# Patient Record
Sex: FEMALE | Race: BLACK OR AFRICAN AMERICAN | Employment: FULL TIME | URBAN - METROPOLITAN AREA
[De-identification: names, ages, dates, MRNs, and addresses within clinical notes are randomized per-mention and may not be internally consistent; named-entity substitution may affect disease eponyms.]

---

## 2017-01-06 ENCOUNTER — OFFICE VISIT (OUTPATIENT)
Dept: ENDOCRINOLOGY | Age: 52
End: 2017-01-06

## 2017-01-06 VITALS
HEIGHT: 67 IN | OXYGEN SATURATION: 100 % | HEART RATE: 90 BPM | BODY MASS INDEX: 34.21 KG/M2 | TEMPERATURE: 97 F | RESPIRATION RATE: 18 BRPM | SYSTOLIC BLOOD PRESSURE: 150 MMHG | DIASTOLIC BLOOD PRESSURE: 84 MMHG | WEIGHT: 218 LBS

## 2017-01-06 DIAGNOSIS — E11.9 TYPE 2 DIABETES MELLITUS WITHOUT COMPLICATION, UNSPECIFIED LONG TERM INSULIN USE STATUS: Primary | ICD-10-CM

## 2017-01-06 DIAGNOSIS — I10 ESSENTIAL HYPERTENSION: ICD-10-CM

## 2017-01-06 DIAGNOSIS — E78.2 MIXED HYPERLIPIDEMIA: ICD-10-CM

## 2017-01-06 LAB — HBA1C MFR BLD HPLC: 7.9 %

## 2017-01-06 RX ORDER — INSULIN ASPART 100 [IU]/ML
INJECTION, SOLUTION INTRAVENOUS; SUBCUTANEOUS
Qty: 20 ML | Refills: 0 | Status: SHIPPED | COMMUNITY
Start: 2017-01-06 | End: 2020-12-22

## 2017-01-06 RX ORDER — INSULIN ASPART 100 [IU]/ML
14 INJECTION, SOLUTION INTRAVENOUS; SUBCUTANEOUS
COMMUNITY

## 2017-01-06 RX ORDER — INDOMETHACIN 50 MG/1
1 CAPSULE ORAL DAILY
COMMUNITY
Start: 2017-01-02

## 2017-01-06 RX ORDER — LOSARTAN POTASSIUM AND HYDROCHLOROTHIAZIDE 12.5; 1 MG/1; MG/1
1 TABLET ORAL DAILY
Qty: 30 TAB | Refills: 6 | Status: SHIPPED | OUTPATIENT
Start: 2017-01-06

## 2017-01-06 NOTE — PATIENT INSTRUCTIONS
Check blood sugars immediately before each meal and at bedtime     janumet xr 50/1000 mg twice a day with meals      [id number]/12.5 mg a day       Take  toujeo  insulin  50 units  at bed time    Take novolog  insulin 14 units before breakfast (11 AM ), 14 units before lunch (4-5 PM) and 14 units before dinner (9-10 PM).     Also, add additional novolog  as follows with meals  If blood sugars are[de-identified]    150-200 mg 2 units    201-250 mg 5 units    251-300 mg 8 units    301-350 mg 11 units    351-400 mg 14 units    401-450 mg 17 units    451-500 mg 20 units     Less than 70 mg NO INSULIN      Use apidra, novolog and humalog interchangeably       Do not skip meals  Do not eat in between meals    Reduce carbs- pasta, rice, potatoes, bread   Do not drink juices or sodas  Donot eat peanut butter     Do not eat sugar free cookies and cakes   Do not eat peaches, grapes, oranges,  pineapples

## 2017-01-06 NOTE — MR AVS SNAPSHOT
Visit Information Date & Time Provider Department Dept. Phone Encounter #  
 1/6/2017  3:30 PM Oral Bai [facility] [phone] [id number] Follow-up Instructions Return in about 6 months (around 7/6/2017). Upcoming Health Maintenance Date Due Hepatitis C Screening 1965 FOOT EXAM Q1 5/26/1975 EYE EXAM RETINAL OR DILATED Q1 5/26/1975 Pneumococcal 19-64 Medium Risk (1 of 1 - PPSV23) 5/26/1984 DTaP/Tdap/Td series (1 - Tdap) 5/26/1986 PAP AKA CERVICAL CYTOLOGY 5/26/1986 BREAST CANCER SCRN MAMMOGRAM 5/26/2015 FOBT Q 1 YEAR AGE 50-75 5/26/2015 MICROALBUMIN Q1 6/10/2015 INFLUENZA AGE 9 TO ADULT 8/1/2016 HEMOGLOBIN A1C Q6M 12/9/2016 LIPID PANEL Q1 6/9/2017 Allergies as of 1/6/2017  Review Complete On: 1/6/2017 By: Oral Bai MD  
  
 Severity Noted Reaction Type Reactions Lisinopril  01/06/2017    Cough Current Immunizations  Never Reviewed No immunizations on file. Not reviewed this visit You Were Diagnosed With   
  
 Codes Comments Type 2 diabetes mellitus without complication, unspecified long term insulin use status (HCC)    -  Primary ICD-10-CM: E11.9 ICD-9-CM: 250.00 Essential hypertension     ICD-10-CM: I10 
ICD-9-CM: 401.9 Mixed hyperlipidemia     ICD-10-CM: E78.2 ICD-9-CM: 272.2 Vitals BP Pulse Temp Resp Height(growth percentile) Weight(growth percentile) 150/84 90 97 °F (36.1 °C) (Oral) 18 5' 7\" (1.702 m) 218 lb (98.9 kg) SpO2 BMI OB Status Smoking Status 100% 34.14 kg/m2 Hysterectomy Never Smoker BMI and BSA Data Body Mass Index Body Surface Area  
 34.14 kg/m 2 2.16 m 2 Preferred Pharmacy Pharmacy Name Phone [phone], [address] [address] Your Updated Medication List  
  
   
This list is accurate as of: 1/6/17  4:37 PM.  Always use your most recent med list.  
  
  
  
  
 aspirin delayed-release 81 mg tablet Take  by mouth daily. diphenhydrAMINE 25 mg tablet Commonly known as:  BENADRYL Take 25 mg by mouth every six (6) hours as needed for Sleep.  
  
 folic acid 792 mcg tablet Take 800 mcg by mouth daily. glucose blood VI test strips strip Commonly known as:  ASCENSIA AUTODISC VI, ONE TOUCH ULTRA TEST VI  
by Does Not Apply route See Admin Instructions. indomethacin 50 mg capsule Commonly known as:  INDOCIN Take 1 Cap by mouth daily. * insulin glargine 300 unit/mL (1.5 mL) Inpn Commonly known as:  TOUJEO SOLOSTAR  
50 Units by SubCUTAneous route nightly. Verbal order received to give Toujeo as a sample. * insulin glargine 300 unit/mL (1.5 mL) Inpn Commonly known as:  TOUJEO SOLOSTAR  
50 Units by SubCUTAneous route nightly. sample * insulin glargine 300 unit/mL (1.5 mL) Inpn Commonly known as:  TOUJEO SOLOSTAR  
50 Units by SubCUTAneous route nightly. insulin glulisine 100 unit/mL pen Commonly known as:  APIDRA SOLOSTAR  
sample * insulin lispro 100 unit/mL kwikpen Commonly known as:  HumaLOG KwikPen Inject 14 units before Breakfast, Lunch and Dinner, Plus sliding scale. Max daily dose 102 units * insulin lispro 100 unit/mL injection Commonly known as:  HUMALOG  
sample Insulin Needles (Disposable) 31 gauge x 3/16\" Ndle Commonly known as:  BD INSULIN PEN NEEDLE UF MINI Inject 4 times daily. Dx code 250.00 Insulin Syringe-Needle U-100 1 mL 30 gauge x 5/16 Syrg Commonly known as:  INSULIN SYRINGE  
100 Each by Does Not Apply route daily. losartan-hydroCHLOROthiazide 100-12.5 mg per tablet Commonly known as:  HYZAAR Take 1 Tab by mouth daily. STOP 50/12.5 MG DOSE  
  
 multivitamin tablet Commonly known as:  ONE A DAY Take 1 Tab by mouth daily. NovoLOG 100 unit/mL injection Generic drug:  insulin aspart 14 Units by SubCUTAneous route Before breakfast, lunch, and dinner. Plus sliding scale  
  
 pravastatin 40 mg tablet Commonly known as:  PRAVACHOL Take 40 mg by mouth nightly. PriLOSEC OTC 20 mg tablet Generic drug:  omeprazole Take 20 mg by mouth daily. SITagliptin-metFORMIN 50-1,000 mg Tm24 Commonly known as:  JANUMET XR Take 1 Tab by mouth two (2) times daily (with meals). This is a change in dose VITAMIN B-12 1,000 mcg tablet Generic drug:  cyanocobalamin Take 1,000 mcg by mouth daily. VITAMIN D3 1,000 unit Cap Generic drug:  cholecalciferol Take  by mouth. * Notice: This list has 5 medication(s) that are the same as other medications prescribed for you. Read the directions carefully, and ask your doctor or other care provider to review them with you. We Performed the Following AMB POC HEMOGLOBIN A1C [34203 CPT(R)] LIPID PANEL [80929 CPT(R)] METABOLIC PANEL, COMPREHENSIVE [61689 CPT(R)] MICROALBUMIN, UR, RAND W/ MICROALBUMIN/CREA RATIO [id number] CPT(R)] Follow-up Instructions Return in about 6 months (around 7/6/2017). Patient Instructions Check blood sugars immediately before each meal and at bedtime 
 
 janumet xr 50/1000 mg twice a day with meals  
 
 [id number]/12.5 mg a day Take  toujeo  insulin  50 units  at bed time Take novolog  insulin 14 units before breakfast (11 AM ), 14 units before lunch (4-5 PM) and 14 units before dinner (9-10 PM). Also, add additional novolog  as follows with meals  If blood sugars are[de-identified] 
 
150-200 mg 2 units 201-250 mg 5 units 251-300 mg 8 units 301-350 mg 11 units 351-400 mg 14 units 401-450 mg 17 units 451-500 mg 20 units Less than 70 mg NO INSULIN 
 
 
Use apidra, novolog and humalog interchangeably Do not skip meals Do not eat in between meals Reduce carbs- pasta, rice, potatoes, bread Do not drink juices or sodas Donot eat peanut butter Do not eat sugar free cookies and cakes Do not eat peaches, grapes, oranges,  pineapples Introducing [facility]! [facility] introduces [facility] patient portal. Now you can access parts of your medical record, email your doctor's office, and request medication refills online. 1. In your internet browser, go to [email or web address] 2. Click on the First Time User? Click Here link in the Sign In box. You will see the New Member Sign Up page. 3. Enter your [facility] Access Code exactly as it appears below. You will not need to use this code after youve completed the sign-up process. If you do not sign up before the expiration date, you must request a new code. · [facility] Access Code: [email or web address] Expires: 4/6/2017  4:37 PM 
 
4. Enter the last four digits of your Social Security Number (xxxx) and Date of Birth (mm/dd/yyyy) as indicated and click Submit. You will be taken to the next sign-up page. 5. Create a [facility] ID. This will be your [facility] login ID and cannot be changed, so think of one that is secure and easy to remember. 6. Create a [facility] password. You can change your password at any time. 7. Enter your Password Reset Question and Answer. This can be used at a later time if you forget your password. 8. Enter your e-mail address. You will receive e-mail notification when new information is available in [address] 9. Click Sign Up. You can now view and download portions of your medical record. 10. Click the Download Summary menu link to download a portable copy of your medical information. If you have questions, please visit the Frequently Asked Questions section of the [facility] [facility] website. Remember, [facility] [facility] is NOT to be used for urgent needs. For medical emergencies, dial 911. Now available from your iPhone and Android! Please provide this summary of care documentation to your next provider. Your primary care clinician is listed as Nery Mclean. If you have any questions after today's visit, please call [phone].

## 2017-01-06 NOTE — PROGRESS NOTES
HISTORY OF PRESENT ILLNESS  Lubna Yuan is a 46 y.o. female. HPI  Patient here for  F/u after  initial visit of Type 2 diabetes mellitus  From June 2016   2 years of GAP prior to last visit     She lost 4 lbs        Got hospitalized and her hyzaar was changed to diltiazem  She felt diltiazem was giving her cough ( double checked ) and she likes hyzaar and is requesting refill   She had descent blood sugars - when compliant     She is happy with better control but has no plan to have this affordable      Old history :   Current A1C is over 11 % and symptoms/problems include hyperglycemias     Current diabetic medications include intensive insulin injection program.     Current monitoring regimen: home blood tests - one times daily  Home blood sugar records: trend: increasing rapidly  Any episodes of hypoglycemia? no    Weight trend: increasing steadily  Prior visit with dietician: no  Current diet: \"unhealthy\" diet in general  Current exercise: no regular exercise    Known diabetic complications: none  Cardiovascular risk factors: dyslipidemia, diabetes mellitus, hypertension        Review of Systems   Constitutional: Negative. HENT: Negative. Eyes: Positive for blurred vision. Negative for pain and redness. Respiratory: Negative. Cardiovascular: Negative for chest pain, palpitations and leg swelling. Gastrointestinal: Negative. Negative for constipation. Genitourinary: Negative. Musculoskeletal: Negative for myalgias. Skin: Negative. Neurological: Negative. Endo/Heme/Allergies: Negative. Does not bruise/bleed easily. Psychiatric/Behavioral: Negative for depression and memory loss. The patient does not have insomnia. Physical Exam   Constitutional: She is oriented to person, place, and time. She appears well-developed and well-nourished. HENT:   Head: Normocephalic. Eyes: Conjunctivae and EOM are normal. Pupils are equal, round, and reactive to light.    Neck: Normal range of motion. Neck supple. No JVD present. No tracheal deviation present. thyromegaly present. Cardiovascular: Normal rate, regular rhythm and normal heart sounds. No murmur heard. Pulmonary/Chest: Breath sounds normal.   Abdominal: Soft. Bowel sounds are normal.   Musculoskeletal: Normal range of motion. Lymphadenopathy:     She has no cervical adenopathy. Neurological: She is alert and oriented to person, place, and time. She has normal reflexes. Skin: Skin is warm. Psychiatric: She has a normal mood and affect. Lab Results  Component Value Date/Time   Hemoglobin A1c 8.4 06/09/2016 11:03 AM   Hemoglobin A1c, External 8.2 04/14/2016   Glucose 152 06/09/2016 11:03 AM   Glucose  06/10/2014 03:11 PM   Microalb/Creat ratio (ug/mg creat.) 102.5 06/10/2014 03:50 PM   LDL, calculated 140 06/09/2016 11:03 AM   Creatinine 1.00 06/09/2016 11:03 AM      Lab Results  Component Value Date/Time   Cholesterol, total 226 06/09/2016 11:03 AM   HDL Cholesterol 61 06/09/2016 11:03 AM   LDL, calculated 140 06/09/2016 11:03 AM   Triglyceride 123 06/09/2016 11:03 AM       Lab Results  Component Value Date/Time   ALT 14 06/09/2016 11:03 AM   AST 12 06/09/2016 11:03 AM   Alk. phosphatase 68 06/09/2016 11:03 AM   Bilirubin, total 0.3 06/09/2016 11:03 AM       Lab Results  Component Value Date/Time   GFR est AA 75 06/09/2016 11:03 AM   GFR est non-AA 65 06/09/2016 11:03 AM   Creatinine 1.00 06/09/2016 11:03 AM   BUN 19 06/09/2016 11:03 AM   Sodium 142 06/09/2016 11:03 AM   Potassium 4.2 06/09/2016 11:03 AM   Chloride 99 06/09/2016 11:03 AM   CO2 26 06/09/2016 11:03 AM          ASSESSMENT and PLAN  1.  Type 2 DM, uncontrolled : A1c is   7.6 %    From   Jan 2017   Compared to     8.1 %  From April 2016   Compared to   7.5 %  From June 2014 compared to  8.8 % from aug 8 th 2013 compared to over 10 % from oct 2012; over 11 % from aug and sept 2012;     Slight improvement   She is afraid to take insulin, if sugars are below 110 mg  Counseled her that Toujeo and lantus are same kinds of insulin, but concentrated forms    Continuing basal bolus regimen  Changing to novolog by formulary  as of no copays  Changing to Janumet xr from kombiglyze 2.5 / 1gm bid    Discussed SGL-2 and inj incretins with her , but will defer it now    Patient is advised about checking blood sugars 4 times a day and maintaining log book. 2. Hypoglycemia :  Educated on treating the hypoglycemia. Discussed insulin use and prescribed    3. HTN : controlled on hyzaar 50/12.5   Stopped diltiazem xr for cough she reconfirms   She had cough with lisinopril in the past     4. Dyslipidemia : continue pravachol. Lipids are high. Patient is educated about benefits and adverse effects of statins and explained how benefits outweigh risk.     5. use of aspirin to prevent MI and TIA's discussed        > 50 % of time is spent on counseling

## 2017-01-06 NOTE — PROGRESS NOTES
Wt Readings from Last 3 Encounters:   01/06/17 218 lb (98.9 kg)   06/09/16 222 lb (100.7 kg)   06/15/14 232 lb (105.2 kg)     Temp Readings from Last 3 Encounters:   01/06/17 97 °F (36.1 °C) (Oral)   06/09/16 98.4 °F (36.9 °C) (Oral)     BP Readings from Last 3 Encounters:   01/06/17 150/84   06/09/16 157/87   06/15/14 135/87     Pulse Readings from Last 3 Encounters:   01/06/17 90   06/09/16 86   06/10/14 92     Lab Results   Component Value Date/Time    Hemoglobin A1c 8.4 06/09/2016 11:03 AM    Hemoglobin A1c (POC) 7.5 06/10/2014 03:11 PM    Hemoglobin A1c, External 8.2 04/14/2016     No Podiatry  Last Eye exam 2016

## 2017-01-09 LAB
ALBUMIN SERPL-MCNC: 4.9 G/DL (ref 3.5–5.5)
ALBUMIN/CREAT UR: 63.9 MG/G CREAT (ref 0–30)
ALBUMIN/GLOB SERPL: 1.6 {RATIO} (ref 1.1–2.5)
ALP SERPL-CCNC: 73 IU/L (ref 39–117)
ALT SERPL-CCNC: 15 IU/L (ref 0–32)
AST SERPL-CCNC: 13 IU/L (ref 0–40)
BILIRUB SERPL-MCNC: <0.2 MG/DL (ref 0–1.2)
BUN SERPL-MCNC: 20 MG/DL (ref 6–24)
BUN/CREAT SERPL: 17 (ref 9–23)
CALCIUM SERPL-MCNC: 10 MG/DL (ref 8.7–10.2)
CHLORIDE SERPL-SCNC: 99 MMOL/L (ref 96–106)
CHOLEST SERPL-MCNC: 184 MG/DL (ref 100–199)
CO2 SERPL-SCNC: 24 MMOL/L (ref 18–29)
CREAT SERPL-MCNC: 1.2 MG/DL (ref 0.57–1)
CREAT UR-MCNC: 208.5 MG/DL
GLOBULIN SER CALC-MCNC: 3 G/DL (ref 1.5–4.5)
GLUCOSE SERPL-MCNC: 171 MG/DL (ref 65–99)
HDLC SERPL-MCNC: 56 MG/DL
INTERPRETATION, 910389: NORMAL
INTERPRETATION: NORMAL
LDLC SERPL CALC-MCNC: 108 MG/DL (ref 0–99)
MICROALBUMIN UR-MCNC: 133.2 UG/ML
POTASSIUM SERPL-SCNC: 5.2 MMOL/L (ref 3.5–5.2)
PROT SERPL-MCNC: 7.9 G/DL (ref 6–8.5)
SODIUM SERPL-SCNC: 142 MMOL/L (ref 134–144)
SPECIMEN STATUS REPORT, ROLRST: NORMAL
TRIGL SERPL-MCNC: 100 MG/DL (ref 0–149)
VLDLC SERPL CALC-MCNC: 20 MG/DL (ref 5–40)

## 2017-03-17 ENCOUNTER — TELEPHONE (OUTPATIENT)
Dept: ENDOCRINOLOGY | Age: 52
End: 2017-03-17

## 2017-03-20 NOTE — TELEPHONE ENCOUNTER
Called patient concerning question about Novolog. Patient requesting a sample. Explained to patient samples are only given at office visits. Her next appointment is scheduled in June. Explained to patient her options were to apply for patient assistance or go to [employer] and buy insulin overcounter. Explained to patient which insulin she could use in place of Novolog. Patient stated she is not sure what she will decide to do.kathytent stated she is taking her other medications without issues.

## 2017-07-13 RX ORDER — SITAGLIPTIN AND METFORMIN HYDROCHLORIDE 50; 1000 MG/1; MG/1
TABLET, FILM COATED, EXTENDED RELEASE ORAL
Qty: 60 TAB | Refills: 0 | Status: SHIPPED | OUTPATIENT
Start: 2017-07-13

## 2017-10-20 ENCOUNTER — OP HISTORICAL/CONVERTED ENCOUNTER (OUTPATIENT)
Dept: OTHER | Age: 52
End: 2017-10-20

## 2018-02-26 RX ORDER — BLOOD-GLUCOSE METER
KIT MISCELLANEOUS
Qty: 100 STRIP | Refills: 6 | Status: SHIPPED | OUTPATIENT
Start: 2018-02-26 | End: 2019-03-11 | Stop reason: SDUPTHER

## 2018-09-17 ENCOUNTER — OP HISTORICAL/CONVERTED ENCOUNTER (OUTPATIENT)
Dept: OTHER | Age: 53
End: 2018-09-17

## 2019-03-11 RX ORDER — BLOOD-GLUCOSE METER
KIT MISCELLANEOUS
Qty: 100 STRIP | Refills: 6 | Status: SHIPPED | OUTPATIENT
Start: 2019-03-11 | End: 2019-10-19 | Stop reason: SDUPTHER

## 2019-10-20 RX ORDER — BLOOD-GLUCOSE METER
KIT MISCELLANEOUS
Qty: 100 STRIP | Refills: 6 | Status: SHIPPED | OUTPATIENT
Start: 2019-10-20

## 2020-07-17 ENCOUNTER — ED HISTORICAL/CONVERTED ENCOUNTER (OUTPATIENT)
Dept: OTHER | Age: 55
End: 2020-07-17

## 2020-08-29 ENCOUNTER — ED HISTORICAL/CONVERTED ENCOUNTER (OUTPATIENT)
Dept: OTHER | Age: 55
End: 2020-08-29

## 2020-09-28 ENCOUNTER — HOSPITAL ENCOUNTER (OUTPATIENT)
Dept: MAMMOGRAPHY | Age: 55
Discharge: HOME | End: 2020-09-28
Payer: COMMERCIAL

## 2020-09-28 DIAGNOSIS — Z12.31 SCREENING MAMMOGRAM FOR HIGH-RISK PATIENT: ICD-10-CM

## 2020-09-28 PROCEDURE — 77067 SCR MAMMO BI INCL CAD: CPT

## 2020-12-22 ENCOUNTER — HOSPITAL ENCOUNTER (EMERGENCY)
Age: 55
Discharge: HOME | End: 2020-12-22
Attending: EMERGENCY MEDICINE
Payer: COMMERCIAL

## 2020-12-22 ENCOUNTER — APPOINTMENT (OUTPATIENT)
Dept: CT IMAGING | Age: 55
End: 2020-12-22
Attending: EMERGENCY MEDICINE
Payer: COMMERCIAL

## 2020-12-22 VITALS
DIASTOLIC BLOOD PRESSURE: 86 MMHG | WEIGHT: 220 LBS | RESPIRATION RATE: 18 BRPM | HEART RATE: 117 BPM | HEIGHT: 68 IN | TEMPERATURE: 98.9 F | SYSTOLIC BLOOD PRESSURE: 172 MMHG | OXYGEN SATURATION: 100 % | BODY MASS INDEX: 33.34 KG/M2

## 2020-12-22 DIAGNOSIS — I10 ESSENTIAL HYPERTENSION: Primary | ICD-10-CM

## 2020-12-22 DIAGNOSIS — J01.00 ACUTE NON-RECURRENT MAXILLARY SINUSITIS: ICD-10-CM

## 2020-12-22 DIAGNOSIS — N39.0 URINARY TRACT INFECTION WITHOUT HEMATURIA, SITE UNSPECIFIED: ICD-10-CM

## 2020-12-22 DIAGNOSIS — R73.9 HYPERGLYCEMIA: ICD-10-CM

## 2020-12-22 LAB
AMORPH CRY URNS QL MICRO: ABNORMAL
APPEARANCE UR: CLEAR
BACTERIA URNS QL MICRO: NEGATIVE /HPF
BILIRUB UR QL: NEGATIVE
COLOR UR: YELLOW
EPITH CASTS URNS QL MICRO: ABNORMAL /LPF
GLUCOSE BLD STRIP.AUTO-MCNC: 166 MG/DL (ref 65–100)
GLUCOSE UR STRIP.AUTO-MCNC: 50 MG/DL
HGB UR QL STRIP: NEGATIVE
KETONES UR QL STRIP.AUTO: NEGATIVE MG/DL
LEUKOCYTE ESTERASE UR QL STRIP.AUTO: ABNORMAL
NITRITE UR QL STRIP.AUTO: NEGATIVE
PERFORMED BY, TECHID: ABNORMAL
PH UR STRIP: 5 [PH] (ref 5–8)
PROT UR STRIP-MCNC: 30 MG/DL
RBC #/AREA URNS HPF: ABNORMAL /HPF (ref 0–5)
SP GR UR REFRACTOMETRY: 1.01 (ref 1–1.03)
UROBILINOGEN UR QL STRIP.AUTO: 0.1 EU/DL (ref 0.2–1)
WBC URNS QL MICRO: ABNORMAL /HPF (ref 0–4)

## 2020-12-22 PROCEDURE — 96374 THER/PROPH/DIAG INJ IV PUSH: CPT

## 2020-12-22 PROCEDURE — 82962 GLUCOSE BLOOD TEST: CPT

## 2020-12-22 PROCEDURE — 81001 URINALYSIS AUTO W/SCOPE: CPT

## 2020-12-22 PROCEDURE — 74011250636 HC RX REV CODE- 250/636: Performed by: EMERGENCY MEDICINE

## 2020-12-22 PROCEDURE — 99283 EMERGENCY DEPT VISIT LOW MDM: CPT

## 2020-12-22 PROCEDURE — 70450 CT HEAD/BRAIN W/O DYE: CPT

## 2020-12-22 RX ORDER — ONDANSETRON 2 MG/ML
4 INJECTION INTRAMUSCULAR; INTRAVENOUS
Status: COMPLETED | OUTPATIENT
Start: 2020-12-22 | End: 2020-12-22

## 2020-12-22 RX ORDER — LORATADINE 10 MG/1
10 TABLET ORAL DAILY
Qty: 10 TAB | Refills: 0 | Status: SHIPPED | OUTPATIENT
Start: 2020-12-22 | End: 2021-01-01

## 2020-12-22 RX ORDER — CEPHALEXIN 500 MG/1
500 CAPSULE ORAL 4 TIMES DAILY
Qty: 28 CAP | Refills: 0 | Status: SHIPPED | OUTPATIENT
Start: 2020-12-22 | End: 2020-12-29

## 2020-12-22 RX ADMIN — SODIUM CHLORIDE 1000 ML: 9 INJECTION, SOLUTION INTRAVENOUS at 16:17

## 2020-12-22 RX ADMIN — ONDANSETRON 4 MG: 2 INJECTION INTRAMUSCULAR; INTRAVENOUS at 17:15

## 2020-12-22 NOTE — ED PROVIDER NOTES
EMERGENCY DEPARTMENT HISTORY AND PHYSICAL EXAM      Date: 12/22/2020  Patient Name: Maren Pink      History of Presenting Illness     Chief Complaint   Patient presents with    Headache    Hypertension    High Blood Sugar       History Provided By: Patient    HPI: Maren Pink, 54 y.o. female with a past medical history significant diabetes and hypertension presents to the ED with cc of Headache, elevated BS. Went to [facility] and had labs done. BS was 207, Cr 1.4, Hb 11. Labs otherwise normal. Was sent here for further evaluation. 1 day    There are no other complaints, changes, or physical findings at this time. PCP: Henri Stock MD    Current Facility-Administered Medications   Medication Dose Route Frequency Provider Last Rate Last Admin    sodium chloride 0.9 % bolus infusion 1,000 mL  1,000 mL IntraVENous ONCE Kurtis Collier MD 1,000 mL/hr at 12/22/20 1617 1,000 mL at 12/22/20 1617     Current Outpatient Medications   Medication Sig Dispense Refill    cephALEXin (Keflex) 500 mg capsule Take 1 Cap by mouth four (4) times daily for 7 days. 28 Cap 0    loratadine (Claritin) 10 mg tablet Take 1 Tab by mouth daily for 10 days. 10 Tab 0    JANUMET XR 50-1,000 mg TM24 TAKE 1 TABLET TWICE DAILY WITH MEALS 60 Tab 0    insulin aspart (NOVOLOG) 100 unit/mL injection 14 Units by SubCUTAneous route Before breakfast, lunch, and dinner. Plus sliding scale      losartan-hydroCHLOROthiazide (HYZAAR) 100-12.5 mg per tablet Take 1 Tab by mouth daily. STOP 50/12.5 MG DOSE 30 Tab 6    insulin lispro (HUMALOG KWIKPEN) 100 unit/mL kwikpen Inject 14 units before Breakfast, Lunch and Dinner, Plus sliding scale. Max daily dose 102 units 30 mL 6    omeprazole (PRILOSEC OTC) 20 mg tablet Take 20 mg by mouth daily.  aspirin delayed-release 81 mg tablet Take  by mouth daily.  Cholecalciferol, Vitamin D3, (VITAMIN D3) 1,000 unit cap Take  by mouth.       folic acid 912 mcg tablet Take 800 mcg by mouth daily.      cyanocobalamin (VITAMIN B-12) 1,000 mcg tablet Take 1,000 mcg by mouth daily.  pravastatin (PRAVACHOL) 40 mg tablet Take 40 mg by mouth nightly.  multivitamin (ONE A DAY) tablet Take 1 Tab by mouth daily.  FREESTYLE LITE STRIPS strip USE 1 STRIP TO CHECK GLUCOSE 4 TIMES DAILY 100 Strip 6    FREESTYLE LITE STRIPS strip USE ONE STRIP TO CHECK GLUCOSE 4 TIMES DAILY 450 Strip 4    indomethacin (INDOCIN) 50 mg capsule Take 1 Cap by mouth daily.  insulin lispro (HUMALOG) 100 unit/mL injection sample 1 Vial 0    glucose blood VI test strips (ASCENSIA AUTODISC VI, ONE TOUCH ULTRA TEST VI) strip by Does Not Apply route See Admin Instructions.  Insulin Needles, Disposable, (BD INSULIN PEN NEEDLE UF MINI) 31 x 3/16 \" ndle Inject 4 times daily. Dx code 250.00 200 Each 6    Insulin Syringe-Needle U-100 (INSULIN SYRINGE) 1 mL 30 x 5/16\" Syrg 100 Each by Does Not Apply route daily. 100 Syringe 6       Past History     Past Medical History:  Past Medical History:   Diagnosis Date    DM (diabetes mellitus) ([facility] [address]     High cholesterol     HTN (hypertension)        Past Surgical History:  History reviewed. No pertinent surgical history. Family History:  Family History   Problem Relation Age of Onset    Cancer Mother     Diabetes Mother     Hypertension Mother     Breast Cancer Mother     Kidney Disease Father        Social History:  Social History     Tobacco Use    Smoking status: Never Smoker    Smokeless tobacco: Never Used   Substance Use Topics    Alcohol use: No    Drug use: No       Allergies: Allergies   Allergen Reactions    Lisinopril Cough         Review of Systems     Review of Systems   Constitutional: Negative. HENT: Negative. Eyes: Negative. Respiratory: Negative. Cardiovascular: Negative. Gastrointestinal: Negative. Genitourinary: Negative. Musculoskeletal: Negative. Neurological: Positive for headaches.         Flashing lights   All other systems reviewed and are negative. Physical Exam     Physical Exam  Vitals signs and nursing note reviewed. Constitutional:       General: She is not in acute distress. Appearance: Normal appearance. She is obese. She is not ill-appearing. HENT:      Head: Normocephalic and atraumatic. Neck:      Musculoskeletal: Normal range of motion and neck supple. Cardiovascular:      Rate and Rhythm: Regular rhythm. Pulses: Normal pulses. Heart sounds: Normal heart sounds. Pulmonary:      Effort: Pulmonary effort is normal.      Breath sounds: Normal breath sounds. Musculoskeletal: Normal range of motion. Neurological:      General: No focal deficit present. Mental Status: She is alert and oriented to person, place, and time.          Lab and Diagnostic Study Results     Labs -     Recent Results (from the past 12 hour(s))   GLUCOSE, POC    Collection Time: 12/22/20  4:08 PM   Result Value Ref Range    Glucose (POC) 166 (H) 65 - 100 mg/dL    Performed by Sheyla Rodriguez    URINALYSIS W/ RFLX MICROSCOPIC    Collection Time: 12/22/20  4:15 PM   Result Value Ref Range    Color Yellow      Appearance Clear Clear      Specific gravity 1.010 1.003 - 1.030      pH (UA) 5.0 5.0 - 8.0      Protein 30 (A) Negative mg/dL    Glucose 50 (A) Negative mg/dL    Ketone Negative Negative mg/dL    Bilirubin Negative Negative      Blood Negative Negative      Urobilinogen 0.1 (L) 0.2 - 1.0 EU/dL    Nitrites Negative Negative      Leukocyte Esterase Trace (A) Negative     URINE MICROSCOPIC    Collection Time: 12/22/20  4:15 PM   Result Value Ref Range    WBC 5-10 0 - 4 /hpf    RBC 0-5 0 - 5 /hpf    Epithelial cells Moderate (A) Few /lpf    Bacteria Negative Negative /hpf    Amorphous Crystals 1+ (A) Negative       Radiologic Studies -   [unfilled]  CT Results  (Last 48 hours)               12/22/20 1636  CT HEAD WO CONT Final result    Impression:  Impression:  Normal exam   Chronic left maxillary sinusitis       Narrative:  CT dose reduction was achieved through use of a standardized protocol tailored   for this examination and automatic exposure control for dose modulation. CT Head       History:        The sulcal pattern is symmetric. No abnormality is seen in gray or white matter. The ventricles are symmetric and normal in size. There is no midline shift or   mass effect, or evidence of hemorrhage. Bone windows show no fracture. CXR Results  (Last 48 hours)    None          Medical Decision Making and ED Course   - I am the first and primary provider for this patient AND AM THE PRIMARY PROVIDER OF RECORD. - I reviewed the vital signs, available nursing notes, past medical history, past surgical history, family history and social history. - Initial assessment performed. The patients presenting problems have been discussed, and the staff are in agreement with the care plan formulated and outlined with them. I have encouraged them to ask questions as they arise throughout their visit. Vital Signs-Reviewed the patient's vital signs. Patient Vitals for the past 12 hrs:   Temp Pulse Resp BP SpO2   12/22/20 1543 98.9 °F (37.2 °C) (!) 117 18 (!) 172/86 100 %         Records Reviewed: Nursing Notes, Old Medical Records and Previous Laboratory Studies    The patient presents with headache with a differential diagnosis of  tension headache    ED Course:              Provider Notes (Medical Decision Making): MDM           Consultations:       Consultations:         Procedures and Critical Care       Performed by: Wendy Garces MD  PROCEDURES:  Procedures         HYPERTENSION COUNSELING: Education was provided to the patient today regarding their hypertension. Patient is made aware of their elevated blood pressure and is instructed to follow up this week with their Primary Care for a recheck.  Patient is counseled regarding consequences of chronic, uncontrolled hypertension including kidney disease, heart disease, stroke or even death. Patient states their understanding and agrees to follow up this week. Additionally, during their visit, I discussed sodium restriction, maintaining ideal body weight and regular exercise program as physiologic means to achieve blood pressure control. The patient will strive towards this. CRITICAL CARE NOTE :  4:08 PM      Disposition     Disposition: DC- Adult Discharges: All of the diagnostic tests were reviewed and questions answered. Diagnosis, care plan and treatment options were discussed. The patient understands the instructions and will follow up as directed. The patients results have been reviewed with them. They have been counseled regarding their diagnosis. The patient verbally convey understanding and agreement of the signs, symptoms, diagnosis, treatment and prognosis and additionally agrees to follow up as recommended with their PCP in 24 - 48 hours. They also agree with the care-plan and convey that all of their questions have been answered. I have also put together some discharge instructions for them that include: 1) educational information regarding their diagnosis, 2) how to care for their diagnosis at home, as well a 3) list of reasons why they would want to return to the ED prior to their follow-up appointment, should their condition change. Discharged    Remove if not discharged  DISCHARGE PLAN:  1. Current Discharge Medication List      CONTINUE these medications which have NOT CHANGED    Details   JANUMET XR 50-1,000 mg TM24 TAKE 1 TABLET TWICE DAILY WITH MEALS  Qty: 60 Tab, Refills: 0      insulin aspart (NOVOLOG) 100 unit/mL injection 14 Units by SubCUTAneous route Before breakfast, lunch, and dinner.  Plus sliding scale    Associated Diagnoses: Type 2 diabetes mellitus without complication, unspecified long term insulin use status; Essential hypertension; Mixed hyperlipidemia      losartan-hydroCHLOROthiazide (HYZAAR) 100-12.5 mg per tablet Take 1 Tab by mouth daily. STOP 50/12.5 MG DOSE  Qty: 30 Tab, Refills: 6      insulin lispro (HUMALOG KWIKPEN) 100 unit/mL kwikpen Inject 14 units before Breakfast, Lunch and Dinner, Plus sliding scale. Max daily dose 102 units  Qty: 30 mL, Refills: 6    Comments: Max daily dose 102 units  Associated Diagnoses: Type II diabetes mellitus, uncontrolled (HCC)      omeprazole (PRILOSEC OTC) 20 mg tablet Take 20 mg by mouth daily. Associated Diagnoses: DM (diabetes mellitus) ([facility] Type II or unspecified type diabetes mellitus without mention of complication, uncontrolled; HTN (hypertension); Hyperlipidemia      aspirin delayed-release 81 mg tablet Take  by mouth daily. Associated Diagnoses: [facility] Type II or unspecified type diabetes mellitus without mention of complication, uncontrolled; HTN (hypertension); Hyperlipidemia      Cholecalciferol, Vitamin D3, (VITAMIN D3) 1,000 unit cap Take  by mouth. Associated Diagnoses: DM (diabetes mellitus) ([facility] Type II or unspecified type diabetes mellitus without mention of complication, uncontrolled; HTN (hypertension); Hyperlipidemia      folic acid 471 mcg tablet Take 800 mcg by mouth daily. Associated Diagnoses: DM (diabetes mellitus) ([facility] Type II or unspecified type diabetes mellitus without mention of complication, uncontrolled; HTN (hypertension); Hyperlipidemia      cyanocobalamin (VITAMIN B-12) 1,000 mcg tablet Take 1,000 mcg by mouth daily. Associated Diagnoses: DM (diabetes mellitus) ([facility] Type II or unspecified type diabetes mellitus without mention of complication, uncontrolled; HTN (hypertension); Hyperlipidemia      pravastatin (PRAVACHOL) 40 mg tablet Take 40 mg by mouth nightly. Associated Diagnoses: DM (diabetes mellitus) ([facility]      multivitamin (ONE A DAY) tablet Take 1 Tab by mouth daily. Associated Diagnoses: DM (diabetes mellitus) ([facility]      ! ! FREESTYLE LITE STRIPS strip USE 1 STRIP TO CHECK GLUCOSE 4 TIMES DAILY  Qty: 100 Strip, Refills: 6    Comments: Please consider 90 day supplies to promote better adherence      !! FREESTYLE LITE STRIPS strip USE ONE STRIP TO CHECK GLUCOSE 4 TIMES DAILY  Qty: 450 Strip, Refills: 4    Comments: Please consider 90 day supplies to promote better adherence      indomethacin (INDOCIN) 50 mg capsule Take 1 Cap by mouth daily. Associated Diagnoses: Type 2 diabetes mellitus without complication, unspecified long term insulin use status; Essential hypertension; Mixed hyperlipidemia      insulin lispro (HUMALOG) 100 unit/mL injection sample  Qty: 1 Vial, Refills: 0      !! glucose blood VI test strips (ASCENSIA AUTODISC VI, ONE TOUCH ULTRA TEST VI) strip by Does Not Apply route See Admin Instructions. Associated Diagnoses: Type 2 diabetes mellitus with hyperglycemia ([facility] [address] Encounter for long-term (current) use of insulin ([facility] [address] Essential hypertension with goal blood pressure less than 130/80; Mixed hyperlipidemia      Insulin Needles, Disposable, (BD INSULIN PEN NEEDLE UF MINI) 31 x 3/16 \" ndle Inject 4 times daily. Dx code 250.00  Qty: 200 Each, Refills: 6    Associated Diagnoses: DM (diabetes mellitus) ([address] Type II or unspecified type diabetes mellitus without mention of complication, uncontrolled; HTN (hypertension); Hyperlipidemia      Insulin Syringe-Needle U-100 (INSULIN SYRINGE) 1 mL 30 x 5/16\" Syrg 100 Each by Does Not Apply route daily. Qty: 100 Syringe, Refills: 6       !! - Potential duplicate medications found. Please discuss with provider. 2.   Follow-up Information     Follow up With Specialties Details Why Contact Info    Harriett Daniel MD Family Medicine In 2 days  [address]  [phone]          3. Return to ED if worse   4.    Current Discharge Medication List      START taking these medications    Details   cephALEXin (Keflex) 500 mg capsule Take 1 Cap by mouth four (4) times daily for 7 days. Qty: 28 Cap, Refills: 0      loratadine (Claritin) 10 mg tablet Take 1 Tab by mouth daily for 10 days. Qty: 10 Tab, Refills: 0             Diagnosis     Clinical Impression:   1. Essential hypertension    2. Hyperglycemia    3. Urinary tract infection without hematuria, site unspecified    4. Acute non-recurrent maxillary sinusitis        Attestations:    Gabrielle Deal MD    Please note that this dictation was completed with Nu[employer], the computer voice recognition software. Quite often unanticipated grammatical, syntax, homophones, and other interpretive errors are inadvertently transcribed by the computer software. Please disregard these errors. Please excuse any errors that have escaped final proofreading. Thank you.

## 2020-12-22 NOTE — ED TRIAGE NOTES
Headache started today on left side above eye and left temporal area, feels like pressure, bp high today, and treated for ear infection and finished antibiotic on Friday. Blood sugar up off and on x 1 wk, usally in 120's but is high today. She called PCP and told her to go to ER. Went [facility] and they did labs and ekg and was told to come here. No N,V, diarrhea, Chest pain or sob.   PT stated her sinuses have been draining a lot lately

## 2021-08-03 PROBLEM — I10 HTN (HYPERTENSION): Status: RESOLVED | Noted: 2021-08-03 | Resolved: 2021-08-03

## 2021-09-03 ENCOUNTER — TRANSCRIBE ORDER (OUTPATIENT)
Dept: SCHEDULING | Age: 56
End: 2021-09-03

## 2021-09-03 DIAGNOSIS — G47.33 OBSTRUCTIVE SLEEP APNEA: Primary | ICD-10-CM

## 2021-11-19 ENCOUNTER — TRANSCRIBE ORDER (OUTPATIENT)
Dept: SCHEDULING | Age: 56
End: 2021-11-19

## 2021-11-19 DIAGNOSIS — Z12.31 OTHER SCREENING MAMMOGRAM: Primary | ICD-10-CM

## 2021-12-08 ENCOUNTER — HOSPITAL ENCOUNTER (OUTPATIENT)
Dept: MAMMOGRAPHY | Age: 56
Discharge: HOME | End: 2021-12-08
Payer: COMMERCIAL

## 2021-12-08 DIAGNOSIS — Z12.31 OTHER SCREENING MAMMOGRAM: ICD-10-CM

## 2021-12-08 PROCEDURE — 77063 BREAST TOMOSYNTHESIS BI: CPT

## 2022-01-26 ENCOUNTER — OFFICE VISIT (OUTPATIENT)
Dept: ENT CLINIC | Age: 57
End: 2022-01-26
Payer: COMMERCIAL

## 2022-01-26 VITALS
WEIGHT: 223 LBS | OXYGEN SATURATION: 97 % | RESPIRATION RATE: 16 BRPM | HEIGHT: 68 IN | BODY MASS INDEX: 33.8 KG/M2 | SYSTOLIC BLOOD PRESSURE: 170 MMHG | TEMPERATURE: 97.4 F | DIASTOLIC BLOOD PRESSURE: 88 MMHG | HEART RATE: 104 BPM

## 2022-01-26 DIAGNOSIS — J02.9 REFLUX PHARYNGITIS: ICD-10-CM

## 2022-01-26 DIAGNOSIS — H69.83 ETD (EUSTACHIAN TUBE DYSFUNCTION), BILATERAL: Primary | ICD-10-CM

## 2022-01-26 DIAGNOSIS — J32.9 CHRONIC SINUSITIS, UNSPECIFIED LOCATION: ICD-10-CM

## 2022-01-26 PROCEDURE — 99203 OFFICE O/P NEW LOW 30 MIN: CPT | Performed by: SPECIALIST

## 2022-01-26 PROCEDURE — 31231 NASAL ENDOSCOPY DX: CPT | Performed by: SPECIALIST

## 2022-01-26 NOTE — PROGRESS NOTES
Subjective:      Lissa Cavazos   64 y.o.   1965       Patient Visit:  59-year-old female with diabetes mellitus and hypertension who presents with postnasal drainage occasional clearing of her throat and fullness of her ears. .  This has been going on for years. She was seen by ENT in the past who told her that she might benefit from PE tubes. She does have a history of temporal headaches but denies any facial pain. Occasionally she can have yellow postnasal drainage. She does admit to occasional clenching and grinding of her teeth. She has a history of GERD for which she takes Prilosec 20 mg. Phy Exam:      General: NAD, well-developed well-nourished  Eyes: PERRLA, EOMs intact  Ears: External canals clear, TMs clear, tuning fork normal  Nose: Septum midline, turbinates normal, mucosa normal, no external deformity  Mouth: Mucosa normal, tongue normal, floor of mouth normal  Throat: Clear, tonsils absent  Neck: Supple without masses, no bruits, some tenderness of TMJs  Chest: Clear to auscultation  Heart: Regular rate and rhythm without murmur  Neuro: Cranial nerves II through XII grossly intact      Fiberoptic nasal endoscopy: After proper consent and under topical anesthesia the flexible scope was passed into the left side of the nose. The middle meatus is clear with no evidence of any purulence or polyposis. The nasopharynx is clear. The inferior meatus is clear. The scope was then passed into the right side of the nose. The middle meatus is clear with no purulence or polyposis. The nasopharynx is clear. The patient tolerated procedure well. ROS:    Heent: No diplopia, no hearing loss, no tinnitis, no nasal congestion, no sinus pain, no dysphygia, no sore throat.   Neck:  No neck mass, no neck pain  Respiratory:  No cough, no hemoptysis, no SOB, no wheezing  CV:  No chest pain, no arrythmias, no syncope  GI:  No nausea, no vomiting, no abdominal pain  Neuro:  No headache, no loss of consciousness, no paralysis, no weakness       Past Medical History:   Diagnosis Date    DM (diabetes mellitus) ([address]     High cholesterol     HTN (hypertension)        Past Surgical History:   Procedure Laterality Date    HX HYSTERECTOMY          Family History   Problem Relation Age of Onset    Cancer Mother     Diabetes Mother     Hypertension Mother     Breast Cancer Mother     Kidney Disease Father        Social History     Tobacco Use    Smoking status: Never Smoker    Smokeless tobacco: Never Used   Substance Use Topics    Alcohol use: No        Prior to Admission medications    Medication Sig Start Date End Date Taking? Authorizing Provider   NILOUMEDRE XR 50-1,000 mg TM24 TAKE 1 TABLET TWICE DAILY WITH MEALS 7/13/17  Yes Maryann Campoverde MD   indomethacin (INDOCIN) 50 mg capsule Take 1 Cap by mouth daily. 1/2/17  Yes Provider, Historical   losartan-hydroCHLOROthiazide (HYZAAR) 100-12.5 mg per tablet Take 1 Tab by mouth daily. STOP 50/12.5 MG DOSE 1/6/17  Yes Christiane Cook MD   insulin lispro (HUMALOG KWIKPEN) 100 unit/mL kwikpen Inject 14 units before Breakfast, Lunch and Dinner, Plus sliding scale. Max daily dose 102 units 6/9/16  Yes Christiane Cook MD   omeprazole (PRILOSEC OTC) 20 mg tablet Take 20 mg by mouth daily. Yes Provider, Historical   aspirin delayed-release 81 mg tablet Take  by mouth daily. Yes Provider, Historical   Cholecalciferol, Vitamin D3, (VITAMIN D3) 1,000 unit cap Take  by mouth. Yes Provider, Historical   folic acid 225 mcg tablet Take 800 mcg by mouth daily. Yes Provider, Historical   cyanocobalamin (VITAMIN B-12) 1,000 mcg tablet Take 1,000 mcg by mouth daily. Yes Provider, Historical   pravastatin (PRAVACHOL) 40 mg tablet Take 40 mg by mouth nightly. Yes Provider, Historical   multivitamin (ONE A DAY) tablet Take 1 Tab by mouth daily.      Yes Provider, Historical   FREESTYLE LITE STRIPS strip USE 1 STRIP TO CHECK GLUCOSE 4 TIMES DAILY 10/20/19 Everett Fajardo MD   FREESTYLE LITE STRIPS strip USE ONE STRIP TO CHECK GLUCOSE 4 TIMES DAILY 2/24/18   Devin Chambers MD   insulin aspart (NOVOLOG) 100 unit/mL injection 14 Units by SubCUTAneous route Before breakfast, lunch, and dinner. Plus sliding scale  Patient not taking: Reported on 1/26/2022    Provider, Historical   insulin lispro (HUMALOG) 100 unit/mL injection sample  Patient not taking: Reported on 1/26/2022 10/21/16   Devin Chambers MD   glucose blood VI test strips (ASCENSIA AUTODISC VI, ONE TOUCH ULTRA TEST VI) strip by Does Not Apply route See Admin Instructions. Provider, Historical   Insulin Needles, Disposable, (BD INSULIN PEN NEEDLE UF MINI) 31 x 3/16 \" ndle Inject 4 times daily. Dx code 250.00 6/10/14   Devin Chambers MD   Insulin Syringe-Needle U-100 (INSULIN SYRINGE) 1 mL 30 x 5/16\" Syrg 100 Each by Does Not Apply route daily. 8/8/13   Dvein Chambers MD              Objective:     Visit Vitals  BP (!) 170/88 (BP 1 Location: Left upper arm, BP Patient Position: Sitting, BP Cuff Size: Large adult)   Pulse (!) 104   Temp 97.4 °F (36.3 °C) (Temporal)   Resp 16   Ht 5' 8\" (1.727 m)   Wt 223 lb (101.2 kg)   SpO2 97%   BMI 33.91 kg/m²         Allergies   Allergen Reactions    Lisinopril Cough       Assessment/Plan:   Bilateral ETD and probable LPR in a diabetic: Patient may consider PE tubes in the future however there is a risk of infection. We will have her use strict acid precautions and increase her Prilosec to 40 mg/day. Follow-up 2 months. Encounter Diagnoses   Name Primary?  ETD (Eustachian tube dysfunction), bilateral Yes    Reflux pharyngitis     Chronic sinusitis, unspecified location        Orders Placed This Encounter    NASAL ENDOSCOPY,DX       Follow-up and Dispositions    · Return in about 2 months (around 3/26/2022). Alvino Azul MD, [address] [facility]    [address]  [facility] [address]

## 2022-01-26 NOTE — PROGRESS NOTES
Visit Vitals  BP (!) 170/88 (BP 1 Location: Left upper arm, BP Patient Position: Sitting, BP Cuff Size: Large adult)   Pulse (!) 104   Temp 97.4 °F (36.3 °C) (Temporal)   Resp 16   Ht 5' 8\" (1.727 m)   Wt 223 lb (101.2 kg)   SpO2 97%   BMI 33.91 kg/m²     Chief Complaint   Patient presents with    New Patient     Referred by PCP for fluid in the ears. Sinus drainage.

## 2022-03-07 ENCOUNTER — TELEPHONE (OUTPATIENT)
Dept: ENT CLINIC | Age: 57
End: 2022-03-07

## 2022-03-07 NOTE — TELEPHONE ENCOUNTER
Attempted to contact pt regarding VM left on 3/4 asking if she has a co-pay for her upcoming visit on 3/7. I was unable to reach pt to give that information, VM is full.

## 2022-03-08 ENCOUNTER — OFFICE VISIT (OUTPATIENT)
Dept: ENT CLINIC | Age: 57
End: 2022-03-08
Payer: COMMERCIAL

## 2022-03-08 VITALS
SYSTOLIC BLOOD PRESSURE: 150 MMHG | HEART RATE: 104 BPM | HEIGHT: 68 IN | DIASTOLIC BLOOD PRESSURE: 90 MMHG | RESPIRATION RATE: 18 BRPM | WEIGHT: 223 LBS | OXYGEN SATURATION: 97 % | TEMPERATURE: 98.2 F | BODY MASS INDEX: 33.8 KG/M2

## 2022-03-08 DIAGNOSIS — H69.83 ETD (EUSTACHIAN TUBE DYSFUNCTION), BILATERAL: Primary | ICD-10-CM

## 2022-03-08 PROCEDURE — 99213 OFFICE O/P EST LOW 20 MIN: CPT | Performed by: SPECIALIST

## 2022-03-08 NOTE — PROGRESS NOTES
Subjective:        Cristal Perez   64 y.o.   1965     Follow-up visit  59-year-old lady for bilateral ETD and LPR. She appears to be doing fairly well with regards to her LPR symptoms but still has some fullness of her ears. Review of Systems  ROS         Heent: No diplopia, no hearing loss, no tinnitis, no nasal congestion, no sinus pain, no dysphygia, no sore throat. Neck:  No neck mass, no neck pain  Respiratory:  No cough, no hemoptysis, no SOB, no wheezing  CV:  No chest pain, no arrythmias, no syncope  GI:  No nausea, no vomiting, no abdominal pain  Neuro:  No headache, no loss of consciousness, no paralysis, no weakness      Physical Exam    General: NAD, well-developed well-nourished  Eyes: PERRLA, EOMs intact  Ears: External canals clear, TMs: Right ear mildly retracted patient able to auto insufflate with small amount of fluid seen behind the drum, left ear retracted patient able to auto insufflate with no evidence of any fluid   septum midline, turbinates normal, mucosa normal, no external deformity  Mouth: Mucosa normal, tongue normal, floor of mouth normal  Throat: Clear, tonsils absent  Neck: Supple without masses, no bruits  Chest: Clear to auscultation  Heart: Regular rate and rhythm without murmur  Neuro: Cranial nerves II through XII grossly intact         Past Medical History:   Diagnosis Date    DM [facility]diabetes mellitus) ([facility] [address]     High cholesterol     HTN (hypertension)      Past Surgical History:   Procedure Laterality Date    HX HYSTERECTOMY        Family History   Problem Relation Age of Onset    Cancer Mother     Diabetes Mother     Hypertension Mother     Breast Cancer Mother     Kidney Disease Father      Social History     Tobacco Use    Smoking status: Never Smoker    Smokeless tobacco: Never Used   Substance Use Topics    Alcohol use: No      Prior to Admission medications    Medication Sig Start Date End Date Taking?  Authorizing Provider   FREESTYLE LITE STRIPS strip USE 1 STRIP TO CHECK GLUCOSE 4 TIMES DAILY 10/20/19   Otoniel Sinha MD   FREESTYLE LITE STRIPS strip USE ONE STRIP TO CHECK GLUCOSE 4 TIMES DAILY 2/24/18   Natanael Ochoa MD   JANUMET XR 50-1,000 mg TM24 TAKE 1 TABLET TWICE DAILY WITH MEALS 7/13/17   Emilee Lambert MD   indomethacin (INDOCIN) 50 mg capsule Take 1 Cap by mouth daily. 1/2/17   Provider, Historical   insulin aspart (NOVOLOG) 100 unit/mL injection 14 Units by SubCUTAneous route Before breakfast, lunch, and dinner. Plus sliding scale  Patient not taking: Reported on 1/26/2022    Provider, Historical   losartan-hydroCHLOROthiazide (HYZAAR) 100-12.5 mg per tablet Take 1 Tab by mouth daily. STOP 50/12.5 MG DOSE 1/6/17   Natanael Ochoa MD   insulin lispro (HUMALOG) 100 unit/mL injection sample  Patient not taking: Reported on 1/26/2022 10/21/16   Natanael Ochoa MD   glucose blood VI test strips (ASCENSIA AUTODISC VI, ONE TOUCH ULTRA TEST VI) strip by Does Not Apply route See Admin Instructions. Provider, Historical   insulin lispro (HUMALOG KWIKPEN) 100 unit/mL kwikpen Inject 14 units before Breakfast, Lunch and Dinner, Plus sliding scale. Max daily dose 102 units 6/9/16   Natanael Ochoa MD   Insulin Needles, Disposable, (BD INSULIN PEN NEEDLE UF MINI) 31 x 3/16 \" ndle Inject 4 times daily. Dx code 250.00 6/10/14   Natanael Ochoa MD   omeprazole (PRILOSEC OTC) 20 mg tablet Take 20 mg by mouth daily. Provider, Historical   aspirin delayed-release 81 mg tablet Take  by mouth daily. Provider, Historical   Cholecalciferol, Vitamin D3, (VITAMIN D3) 1,000 unit cap Take  by mouth. Provider, Historical   folic acid 209 mcg tablet Take 800 mcg by mouth daily. Provider, Historical   cyanocobalamin (VITAMIN B-12) 1,000 mcg tablet Take 1,000 mcg by mouth daily. Provider, Historical   Insulin Syringe-Needle U-100 (INSULIN SYRINGE) 1 mL 30 x 5/16\" Syrg 100 Each by Does Not Apply route daily.  8/8/13   Natanael Ochoa MD   pravastatin (PRAVACHOL) 40 mg tablet Take 40 mg by mouth nightly. Provider, Historical   multivitamin (ONE A DAY) tablet Take 1 Tab by mouth daily. Provider, Historical                    Objective:     Visit Vitals  BP (!) 150/90 (BP 1 Location: Left upper arm, BP Patient Position: Sitting, BP Cuff Size: Adult)   Pulse (!) 104   Temp 98.2 °F (36.8 °C) (Temporal)   Resp 18   Ht 5' 8\" (1.727 m)   Wt 223 lb (101.2 kg)   SpO2 97%   BMI 33.91 kg/m²        Allergies   Allergen Reactions    Lisinopril Cough         Assessment/Plan:   Eustachian tube dysfunction and diabetic: Once again talked to the patient about options of auto insufflation, antibiotics, myringotomy, myringotomy and tube and patient desires to continue with auto insufflation as she is concerned about possible infection, follow-up as needed  Encounter Diagnoses   Name Primary?  ETD (Eustachian tube dysfunction), bilateral Yes     No orders of the defined types were placed in this encounter. Follow-up and Dispositions    · Return if symptoms worsen or fail to improve. Bakari Azul MD, [address] [facility]    [address]  [place], [address] [place] [address] [phone]. [phone]

## 2022-03-08 NOTE — PROGRESS NOTES
Visit Vitals  Blood Pressure (Abnormal) 150/90 (BP 1 Location: Left upper arm, BP Patient Position: Sitting, BP Cuff Size: Adult)   Pulse (Abnormal) 104   Temperature 98.2 °F (36.8 °C) (Temporal)   Respiration 18   Height 5' 8\" (1.727 m)   Weight 223 lb (101.2 kg)   Oxygen Saturation 97%   Body Mass Index 33.91 kg/m²     Chief Complaint   Patient presents with    Follow-up     ETD (Eustachian tube dysfunction), bilateral

## 2022-03-19 PROBLEM — I10 ESSENTIAL HYPERTENSION: Status: ACTIVE | Noted: 2017-01-06

## 2022-03-19 PROBLEM — E78.2 MIXED HYPERLIPIDEMIA: Status: ACTIVE | Noted: 2017-01-06

## 2022-10-28 ENCOUNTER — HOSPITAL ENCOUNTER (OUTPATIENT)
Dept: GENERAL RADIOLOGY | Age: 57
Discharge: HOME | End: 2022-10-28
Payer: COMMERCIAL

## 2022-10-28 ENCOUNTER — TRANSCRIBE ORDER (OUTPATIENT)
Dept: REGISTRATION | Age: 57
End: 2022-10-28

## 2022-10-28 DIAGNOSIS — M54.2 CERVICAL SPINE PAIN: ICD-10-CM

## 2022-10-28 DIAGNOSIS — M54.2 CERVICAL SPINE PAIN: Primary | ICD-10-CM

## 2022-10-28 PROCEDURE — 72040 X-RAY EXAM NECK SPINE 2-3 VW: CPT

## 2023-01-04 ENCOUNTER — OFFICE VISIT (OUTPATIENT)
Dept: ENT CLINIC | Age: 58
End: 2023-01-04
Payer: COMMERCIAL

## 2023-01-04 VITALS
BODY MASS INDEX: 33.8 KG/M2 | SYSTOLIC BLOOD PRESSURE: 152 MMHG | HEIGHT: 68 IN | DIASTOLIC BLOOD PRESSURE: 92 MMHG | HEART RATE: 109 BPM | OXYGEN SATURATION: 98 % | WEIGHT: 223 LBS | RESPIRATION RATE: 18 BRPM

## 2023-01-04 DIAGNOSIS — J30.9 ALLERGIC RHINITIS, UNSPECIFIED SEASONALITY, UNSPECIFIED TRIGGER: ICD-10-CM

## 2023-01-04 DIAGNOSIS — H73.893 TYMPANIC MEMBRANE RETRACTION, BILATERAL: ICD-10-CM

## 2023-01-04 DIAGNOSIS — H69.83 ETD (EUSTACHIAN TUBE DYSFUNCTION), BILATERAL: Primary | ICD-10-CM

## 2023-01-04 PROCEDURE — 3077F SYST BP >= 140 MM HG: CPT | Performed by: OTOLARYNGOLOGY

## 2023-01-04 PROCEDURE — 99213 OFFICE O/P EST LOW 20 MIN: CPT | Performed by: OTOLARYNGOLOGY

## 2023-01-04 PROCEDURE — 3080F DIAST BP >= 90 MM HG: CPT | Performed by: OTOLARYNGOLOGY

## 2023-01-04 NOTE — PROGRESS NOTES
Otolaryngology-Head and Neck Surgery  Follow Up Patient Visit     Patient: Isabell Oliveira  YOB: 1965  MRN: [id number]  Date of Service:  1/4/2023    Chief Complaint:   Chief Complaint   Patient presents with    Follow-up          History of Present Illness: Isabell Oliveira is a 62y.o. year old female who was last seen by Dr Jai Bailon for chronic ETD, bilateral 3/2022    Tubes were discussed, but she elected for observation    She presents today with continued intermittent bilateral otalgia, with ear plugging and popping    Has allergies, uses zyrtec, singulair and flonase with some relief - has breakthrough symptoms  Was tested for allergies in the past, many years ago unsure of result     Past Medical History:  Past Medical History:   Diagnosis Date    DM (diabetes mellitus) ([address]     High cholesterol     HTN (hypertension)        Past Surgical History:   Past Surgical History:   Procedure Laterality Date    HX HYSTERECTOMY         Medications:   Current Outpatient Medications   Medication Instructions    aspirin delayed-release 81 mg tablet DAILY    Cholecalciferol, Vitamin D3, (VITAMIN D3) 1,000 unit cap Take  by mouth.    cyanocobalamin (VITAMIN B-12) 1,000 mcg, DAILY    folic acid (FOLVITE) 092 mcg, DAILY    FREESTYLE LITE STRIPS strip USE ONE STRIP TO CHECK GLUCOSE 4 TIMES DAILY    FREESTYLE LITE STRIPS strip USE 1 STRIP TO CHECK GLUCOSE 4 TIMES DAILY    glucose blood VI test strips (ASCENSIA AUTODISC VI, ONE TOUCH ULTRA TEST VI) strip Does Not Apply, SEE ADMIN INSTRUCTIONS    indomethacin (INDOCIN) 50 mg capsule 1 Capsule, Oral, DAILY    insulin aspart U-100 (NOVOLOG U-100 INSULIN ASPART) 14 Units, 3 TIMES DAILY BEFORE MEALS    insulin lispro (HUMALOG KWIKPEN) 100 unit/mL kwikpen Inject 14 units before Breakfast, Lunch and Dinner, Plus sliding scale.  Max daily dose 102 units    insulin lispro (HUMALOG) 100 unit/mL injection sample    Insulin Needles, Disposable, (BD INSULIN PEN NEEDLE UF MINI) 31 x 3/16 \" ndle Inject 4 times daily. Dx code 250.00    Insulin Syringe-Needle U-100 (INSULIN SYRINGE) 1 mL 30 x 5/16\" Syrg 100 Each, Does Not Apply, DAILY    JANUMET XR 50-1,000 mg TM24 TAKE 1 TABLET TWICE DAILY WITH MEALS    losartan-hydroCHLOROthiazide (HYZAAR) 100-12.5 mg per tablet 1 Tablet, Oral, DAILY, STOP 50/12.5 MG DOSE    multivitamin (ONE A DAY) tablet 1 Tablet, DAILY    omeprazole (PRILOSEC OTC) 20 mg, DAILY    pravastatin (PRAVACHOL) 40 mg, EVERY BEDTIME       Allergies: Allergies   Allergen Reactions    Lisinopril Cough       Social History:   Social History     Tobacco Use    Smoking status: Never    Smokeless tobacco: Never   Substance Use Topics    Alcohol use: No    Drug use: No       Family History:  Family History   Problem Relation Age of Onset    Cancer Mother     Diabetes Mother     Hypertension Mother     Breast Cancer Mother     Kidney Disease Father        Review of Systems:  Consitutional: denies fever, excessive weight gain or loss. Eyes: denies diplopia, eye pain. Integumentary: denies new concerning skin lesions. Ears, Nose, Mouth, Throat: denies except as per HPI. Endocrine: denies hot or cold intolerance, increased thirst.  Respiratory: denies cough, hemoptysis, wheezing  Gastrointestinal: denies trouble swallowing, nausea, emesis, regurgitation  Musculoskeletal: denies muscle weakness or wasting  Cardiovascular: denies chest pain, shortness of breath  Neurologic: denies seizures, numbness or tingling, syncope  Hematologic: denies easy bleeding or bruising    Physical Examination:   There were no vitals filed for this visit. General: Comfortable, pleasant, appears stated age  Voice: Strong, speaking in full sentences, no stridor    Face: No masses or lesions, facial strength symmetric   Ears: External ears unremarkable. Bilateral ear canal clear. Tympanic membrane clear and intact, with visible landmarks. Clear middle ear space. L > R attic retraction.  No effusion   Nose: External nose unremarkable. Dorsum midline. Anterior rhinoscopy demonstrates no lesions. Septum midline. Turbinates without hypertrophy. Oral Cavity / Oropharynx: No trismus. Mucosa pink and moist. No lesions. Tongue is midline and mobile. Palate elevates symmetrically. Uvula midline. Tonsils unremarkable. Base of tongue soft. Floor of mouth soft. Neck: Supple. No adenopathy. Thyroid unremarkable. Palpable laryngeal landmarks. Full neck range of motion   Neurologic: CN II - XI intact. Normal gait      Assessment and Plan:   Bilateral ETD   Allergic rhinitis  - Discussed management options for ETD  - She's already on medication Rx  - Discussed option of adding antihistamine nasal spray - she is not keen on taste of spray  - Also discussed role of allergy testing and possible IT   - She will consider this  - Also discussed ultimately role of ear tubes, or trial myrigotomy and then ear tubes, but she would like to avoid this if able   - Signs to watch out for discussed, offered follow up otherwise PRN issues           The patient was instructed to return to clinic if no improvement or progression of symptoms. Signs to watch out for reviewed.       MD [facility]  [address] [place] [address] 6  [facility]  Office Phone: [phone]

## 2023-05-21 RX ORDER — INDOMETHACIN 50 MG/1
1 CAPSULE ORAL DAILY
COMMUNITY
Start: 2017-01-02

## 2023-05-21 RX ORDER — ASPIRIN 81 MG/1
TABLET ORAL DAILY
COMMUNITY

## 2023-05-21 RX ORDER — OMEPRAZOLE 20 MG/1
20 TABLET, DELAYED RELEASE ORAL DAILY
COMMUNITY

## 2023-05-21 RX ORDER — SITAGLIPTIN AND METFORMIN HYDROCHLORIDE 1000; 50 MG/1; MG/1
1 TABLET, FILM COATED, EXTENDED RELEASE ORAL 2 TIMES DAILY WITH MEALS
COMMUNITY
Start: 2017-07-13

## 2023-05-21 RX ORDER — INSULIN LISPRO 100 [IU]/ML
INJECTION, SOLUTION INTRAVENOUS; SUBCUTANEOUS
COMMUNITY
Start: 2016-06-09

## 2023-05-21 RX ORDER — LOSARTAN POTASSIUM AND HYDROCHLOROTHIAZIDE 12.5; 1 MG/1; MG/1
1 TABLET ORAL DAILY
COMMUNITY
Start: 2017-01-06

## 2023-05-21 RX ORDER — INSULIN LISPRO 100 [IU]/ML
INJECTION, SOLUTION INTRAVENOUS; SUBCUTANEOUS
COMMUNITY
Start: 2016-10-21

## 2023-05-21 RX ORDER — PRAVASTATIN SODIUM 40 MG
40 TABLET ORAL NIGHTLY
COMMUNITY

## 2023-05-21 RX ORDER — INSULIN ASPART 100 [IU]/ML
14 INJECTION, SOLUTION INTRAVENOUS; SUBCUTANEOUS
COMMUNITY

## 2023-05-21 RX ORDER — UREA 10 %
800 LOTION (ML) TOPICAL DAILY
COMMUNITY

## 2023-07-11 ENCOUNTER — TRANSCRIBE ORDERS (OUTPATIENT)
Facility: HOSPITAL | Age: 58
End: 2023-07-11

## 2023-07-11 DIAGNOSIS — Z12.31 VISIT FOR SCREENING MAMMOGRAM: Primary | ICD-10-CM

## 2023-07-18 ENCOUNTER — HOSPITAL ENCOUNTER (OUTPATIENT)
Facility: HOSPITAL | Age: 58
Discharge: HOME | End: 2023-07-21
Payer: COMMERCIAL

## 2023-07-18 DIAGNOSIS — Z12.31 VISIT FOR SCREENING MAMMOGRAM: ICD-10-CM

## 2023-07-18 PROCEDURE — 77063 BREAST TOMOSYNTHESIS BI: CPT

## 2024-01-12 ENCOUNTER — HOSPITAL ENCOUNTER (OUTPATIENT)
Facility: HOSPITAL | Age: 59
Discharge: HOME | End: 2024-01-12
Attending: LEGAL MEDICINE
Payer: COMMERCIAL

## 2024-01-12 DIAGNOSIS — N18.31 CHRONIC KIDNEY DISEASE (CKD) STAGE G3A/A1, MODERATELY DECREASED GLOMERULAR FILTRATION RATE (GFR) BETWEEN 45-59 ML/MIN/1.73 SQUARE METER AND ALBUMINURIA CREATININE RATIO LESS THAN 30 MG/G (HCC): ICD-10-CM

## 2024-01-12 PROCEDURE — 76770 US EXAM ABDO BACK WALL COMP: CPT

## 2024-03-25 ENCOUNTER — OFFICE VISIT (OUTPATIENT)
Age: 59
End: 2024-03-25
Payer: COMMERCIAL

## 2024-03-25 VITALS
WEIGHT: 222.3 LBS | TEMPERATURE: 98 F | BODY MASS INDEX: 33.69 KG/M2 | HEART RATE: 92 BPM | SYSTOLIC BLOOD PRESSURE: 151 MMHG | HEIGHT: 68 IN | RESPIRATION RATE: 20 BRPM | DIASTOLIC BLOOD PRESSURE: 81 MMHG | OXYGEN SATURATION: 99 %

## 2024-03-25 DIAGNOSIS — E78.2 MIXED HYPERLIPIDEMIA: ICD-10-CM

## 2024-03-25 DIAGNOSIS — E66.09 CLASS 1 OBESITY DUE TO EXCESS CALORIES WITH SERIOUS COMORBIDITY AND BODY MASS INDEX (BMI) OF 33.0 TO 33.9 IN ADULT: ICD-10-CM

## 2024-03-25 DIAGNOSIS — E11.65 TYPE 2 DIABETES MELLITUS WITH HYPERGLYCEMIA, WITH LONG-TERM CURRENT USE OF INSULIN (HCC): Primary | ICD-10-CM

## 2024-03-25 DIAGNOSIS — Z79.4 TYPE 2 DIABETES MELLITUS WITH HYPERGLYCEMIA, WITH LONG-TERM CURRENT USE OF INSULIN (HCC): Primary | ICD-10-CM

## 2024-03-25 DIAGNOSIS — I10 ESSENTIAL HYPERTENSION: ICD-10-CM

## 2024-03-25 DIAGNOSIS — N18.31 STAGE 3A CHRONIC KIDNEY DISEASE (HCC): ICD-10-CM

## 2024-03-25 LAB
GLUCOSE, POC: 148 MG/DL
HBA1C MFR BLD: 8.9 %

## 2024-03-25 PROCEDURE — 99204 OFFICE O/P NEW MOD 45 MIN: CPT | Performed by: STUDENT IN AN ORGANIZED HEALTH CARE EDUCATION/TRAINING PROGRAM

## 2024-03-25 PROCEDURE — 83036 HEMOGLOBIN GLYCOSYLATED A1C: CPT | Performed by: STUDENT IN AN ORGANIZED HEALTH CARE EDUCATION/TRAINING PROGRAM

## 2024-03-25 PROCEDURE — 3078F DIAST BP <80 MM HG: CPT | Performed by: STUDENT IN AN ORGANIZED HEALTH CARE EDUCATION/TRAINING PROGRAM

## 2024-03-25 PROCEDURE — 82962 GLUCOSE BLOOD TEST: CPT | Performed by: STUDENT IN AN ORGANIZED HEALTH CARE EDUCATION/TRAINING PROGRAM

## 2024-03-25 PROCEDURE — 3077F SYST BP >= 140 MM HG: CPT | Performed by: STUDENT IN AN ORGANIZED HEALTH CARE EDUCATION/TRAINING PROGRAM

## 2024-03-25 RX ORDER — ACYCLOVIR 400 MG/1
TABLET ORAL
Qty: 1 EACH | Refills: 1 | Status: SHIPPED | OUTPATIENT
Start: 2024-03-25

## 2024-03-25 RX ORDER — MONTELUKAST SODIUM 10 MG/1
10 TABLET ORAL DAILY
COMMUNITY
Start: 2022-10-07

## 2024-03-25 RX ORDER — BLOOD-GLUCOSE METER
KIT MISCELLANEOUS
COMMUNITY

## 2024-03-25 RX ORDER — CETIRIZINE HYDROCHLORIDE 10 MG/1
10 TABLET ORAL DAILY
COMMUNITY

## 2024-03-25 RX ORDER — DAPAGLIFLOZIN 5 MG/1
5 TABLET, FILM COATED ORAL EVERY MORNING
Qty: 30 TABLET | Refills: 0 | Status: SHIPPED | OUTPATIENT
Start: 2024-03-25

## 2024-03-25 RX ORDER — INSULIN GLARGINE 100 [IU]/ML
50 INJECTION, SOLUTION SUBCUTANEOUS NIGHTLY
COMMUNITY

## 2024-03-25 RX ORDER — ACYCLOVIR 400 MG/1
TABLET ORAL
Qty: 9 EACH | Refills: 3 | Status: SHIPPED | OUTPATIENT
Start: 2024-03-25

## 2024-03-25 NOTE — PROGRESS NOTES
1. \"Have you been to the ER, urgent care clinic since your last visit?  Hospitalized since your last visit?\" No    2. \"Have you seen or consulted any other health care providers outside of the [facility] since your last visit?\" No    3. For patients aged 45-75: Has the patient had a colonoscopy / FIT/ Cologuard? Yes      If the patient is female:    4. For patients aged 40-74: Has the patient had a mammogram within the past 2 years? Yes      5. For patients aged 21-65: Has the patient had a pap smear? No    Chief Complaint   Patient presents with    New Patient    Diabetes     BP (!) 146/75 (Site: Left Upper Arm, Position: Sitting, Cuff Size: Medium Adult)   Pulse 92   Temp 98 °F (36.7 °C) (Oral)   Resp 20   Ht 1.727 m (5' 8\")   Wt 100.8 kg (222 lb 4.8 oz)   SpO2 99%   BMI 33.80 kg/m²     BP (!) 151/81 (Site: Right Upper Arm, Position: Sitting, Cuff Size: Medium Adult)   Pulse 92   Temp 98 °F (36.7 °C) (Oral)   Resp 20   Ht 1.727 m (5' 8\")   Wt 100.8 kg (222 lb 4.8 oz)   SpO2 99%   BMI 33.80 kg/m²     
        I have discussed the diagnosis with the patient and the intended plan .  The patient has received an after-visit summary and questions were answered concerning future plans.  I have discussed medication side effects .    Thank you for allowing me to participate in the care of this patient.    Piper Santiago      There are no Patient Instructions on file for this visit.  No follow-up provider specified.          Patient /caregiver verbalized understanding .  Voice-recognition software was used to generate this report, which may result in some phonetic-based errors in the grammar and contents.  Even though attempts were made to correct all the mistakes, some may have been missed and remained in the body of the report.

## 2024-04-24 RX ORDER — DAPAGLIFLOZIN 5 MG/1
TABLET, FILM COATED ORAL
Qty: 30 TABLET | Refills: 0 | Status: SHIPPED | OUTPATIENT
Start: 2024-04-24

## 2024-05-30 ENCOUNTER — OFFICE VISIT (OUTPATIENT)
Age: 59
End: 2024-05-30
Payer: COMMERCIAL

## 2024-05-30 VITALS
HEART RATE: 86 BPM | BODY MASS INDEX: 33.04 KG/M2 | SYSTOLIC BLOOD PRESSURE: 138 MMHG | OXYGEN SATURATION: 96 % | DIASTOLIC BLOOD PRESSURE: 77 MMHG | RESPIRATION RATE: 16 BRPM | TEMPERATURE: 97.5 F | HEIGHT: 68 IN | WEIGHT: 218 LBS

## 2024-05-30 DIAGNOSIS — E78.2 MIXED HYPERLIPIDEMIA: ICD-10-CM

## 2024-05-30 DIAGNOSIS — N18.31 STAGE 3A CHRONIC KIDNEY DISEASE (HCC): ICD-10-CM

## 2024-05-30 DIAGNOSIS — E11.65 TYPE 2 DIABETES MELLITUS WITH HYPERGLYCEMIA, WITH LONG-TERM CURRENT USE OF INSULIN (HCC): Primary | ICD-10-CM

## 2024-05-30 DIAGNOSIS — Z79.4 TYPE 2 DIABETES MELLITUS WITH HYPERGLYCEMIA, WITH LONG-TERM CURRENT USE OF INSULIN (HCC): Primary | ICD-10-CM

## 2024-05-30 DIAGNOSIS — E66.09 CLASS 1 OBESITY DUE TO EXCESS CALORIES WITH SERIOUS COMORBIDITY AND BODY MASS INDEX (BMI) OF 33.0 TO 33.9 IN ADULT: ICD-10-CM

## 2024-05-30 DIAGNOSIS — I10 ESSENTIAL HYPERTENSION: ICD-10-CM

## 2024-05-30 LAB — GLUCOSE, POC: 200 MG/DL

## 2024-05-30 PROCEDURE — G2211 COMPLEX E/M VISIT ADD ON: HCPCS | Performed by: STUDENT IN AN ORGANIZED HEALTH CARE EDUCATION/TRAINING PROGRAM

## 2024-05-30 PROCEDURE — 3078F DIAST BP <80 MM HG: CPT | Performed by: STUDENT IN AN ORGANIZED HEALTH CARE EDUCATION/TRAINING PROGRAM

## 2024-05-30 PROCEDURE — 3075F SYST BP GE 130 - 139MM HG: CPT | Performed by: STUDENT IN AN ORGANIZED HEALTH CARE EDUCATION/TRAINING PROGRAM

## 2024-05-30 PROCEDURE — 99213 OFFICE O/P EST LOW 20 MIN: CPT | Performed by: STUDENT IN AN ORGANIZED HEALTH CARE EDUCATION/TRAINING PROGRAM

## 2024-05-30 PROCEDURE — 82962 GLUCOSE BLOOD TEST: CPT | Performed by: STUDENT IN AN ORGANIZED HEALTH CARE EDUCATION/TRAINING PROGRAM

## 2024-05-30 RX ORDER — ACYCLOVIR 400 MG/1
1 TABLET ORAL
Qty: 1 EACH | Refills: 0 | Status: SHIPPED | COMMUNITY
Start: 2024-05-30

## 2024-05-30 RX ORDER — DAPAGLIFLOZIN 10 MG/1
10 TABLET, FILM COATED ORAL EVERY MORNING
Qty: 90 TABLET | Refills: 1 | Status: SHIPPED | OUTPATIENT
Start: 2024-05-30 | End: 2024-05-30

## 2024-05-30 RX ORDER — DAPAGLIFLOZIN 10 MG/1
10 TABLET, FILM COATED ORAL EVERY MORNING
Qty: 30 TABLET | Refills: 2 | Status: SHIPPED | OUTPATIENT
Start: 2024-05-30

## 2024-05-30 NOTE — PROGRESS NOTES
Chief Complaint   Patient presents with    Follow-up    Diabetes     /77 (Site: Left Upper Arm, Position: Sitting, Cuff Size: Large Adult)   Pulse 86   Temp 97.5 °F (36.4 °C) (Temporal)   Resp 16   Ht 1.727 m (5' 8\")   Wt 98.9 kg (218 lb)   SpO2 96%   BMI 33.15 kg/m²     
(before meals)    losartan-hydroCHLOROthiazide (HYZAAR) 100-12.5 MG per tablet Take 1 tablet by mouth daily    omeprazole (PRILOSEC OTC) 20 MG tablet Take 1 tablet by mouth daily    pravastatin (PRAVACHOL) 40 MG tablet Take 1 tablet by mouth nightly    SITagliptin-metFORMIN (JANUMET XR)  MG TB24 per extended release tablet Take 1 tablet by mouth 2 times daily (with meals)    Continuous Blood Gluc Sensor (DEXCOM G7 SENSOR) MISC To check blood glucose 4 times daily, change it every 10 days (Patient not taking: Reported on 5/30/2024)    Continuous Blood Gluc  (DEXCOM G7 ) SKYLAR To check blood glucose 4 times daily (Patient not taking: Reported on 5/30/2024)     No current facility-administered medications for this visit.           Review of Systems: Per HPI    Physical Examination:  Blood pressure 138/77, pulse 86, temperature 97.5 °F (36.4 °C), temperature source Temporal, resp. rate 16, height 1.727 m (5' 8\"), weight 98.9 kg (218 lb), SpO2 96 %. Body mass index is 33.15 kg/m².  General: pleasant, no distress, good eye contact  HEENT: no pallor, no periorbital edema, EOMI  Neck: supple, no thyromegaly, no nodules  Cardiovascular: regular,  normal S1 and S2,   Respiratory: clear to auscultation bilaterally  Gastrointestinal: soft, nontender,   Musculoskeletal: Monofilament testing normal   Neurological: alert and oriented  Psychiatric: normal mood and affect    Data Reviewed:     Lab Results   Component Value Date/Time    GLUCPOC 200 05/30/2024 10:02 AM      No results found for: \"GFRAA\", \"CREAPOC\", \"BUN\", \"IBUN\", \"BUNPOC\", \"NA\", \"NAPOC\", \"K\", \"KPOCT\", \"CL\", \"CLPOC\", \"CO2\", \"CO2POC\", \"MG\", \"PHOS\", \"ALBEU\", \"PTH\", \"EPO\"    Results for orders placed or performed in visit on 05/30/24   AMB POC GLUCOSE BLOOD, BY GLUCOSE MONITORING DEVICE   Result Value Ref Range    Glucose,  MG/DL         Assessment/Plan:     1. Type 2 diabetes mellitus with hyperglycemia, with long-term current use of insulin

## 2024-09-09 RX ORDER — DAPAGLIFLOZIN 10 MG/1
TABLET, FILM COATED ORAL
Qty: 30 TABLET | Refills: 2 | Status: SHIPPED | OUTPATIENT
Start: 2024-09-09

## 2024-10-28 ENCOUNTER — TELEPHONE (OUTPATIENT)
Age: 59
End: 2024-10-28

## 2024-10-28 NOTE — TELEPHONE ENCOUNTER
PATIENT CALL IN     Patient had left a vm over the weekend to cancel appt.    Patient has resheduled     Patient would like a call back has further clinical questions

## 2024-10-28 NOTE — TELEPHONE ENCOUNTER
Called and spoke with pt who stated that dexom g7 was not compatible with her phone I informed patient that we did send a  prescription to her pharmacy back in March and informed [facility] to call [facility] service for further asst

## 2024-12-12 ENCOUNTER — TRANSCRIBE ORDERS (OUTPATIENT)
Facility: HOSPITAL | Age: 59
End: 2024-12-12

## 2024-12-12 DIAGNOSIS — Z12.31 OTHER SCREENING MAMMOGRAM: Primary | ICD-10-CM

## 2024-12-13 RX ORDER — DAPAGLIFLOZIN 10 MG/1
TABLET, FILM COATED ORAL
Qty: 30 TABLET | Refills: 2 | OUTPATIENT
Start: 2024-12-13

## 2024-12-18 ENCOUNTER — HOSPITAL ENCOUNTER (OUTPATIENT)
Facility: HOSPITAL | Age: 59
Discharge: HOME | End: 2024-12-21
Payer: COMMERCIAL

## 2024-12-18 DIAGNOSIS — Z12.31 OTHER SCREENING MAMMOGRAM: ICD-10-CM

## 2024-12-18 PROCEDURE — 77063 BREAST TOMOSYNTHESIS BI: CPT

## 2024-12-27 DIAGNOSIS — Z79.4 TYPE 2 DIABETES MELLITUS WITH HYPERGLYCEMIA, WITH LONG-TERM CURRENT USE OF INSULIN (HCC): Primary | ICD-10-CM

## 2024-12-27 DIAGNOSIS — E11.65 TYPE 2 DIABETES MELLITUS WITH HYPERGLYCEMIA, WITH LONG-TERM CURRENT USE OF INSULIN (HCC): Primary | ICD-10-CM

## 2024-12-27 RX ORDER — DAPAGLIFLOZIN 10 MG/1
10 TABLET, FILM COATED ORAL EVERY MORNING
Qty: 30 TABLET | Refills: 0 | Status: SHIPPED | OUTPATIENT
Start: 2024-12-27

## 2024-12-27 NOTE — TELEPHONE ENCOUNTER
Patient called in and needs refill on [facility]     Pharmacy is [facility]     Scheduled appt

## 2025-01-25 DIAGNOSIS — Z79.4 TYPE 2 DIABETES MELLITUS WITH HYPERGLYCEMIA, WITH LONG-TERM CURRENT USE OF INSULIN (HCC): ICD-10-CM

## 2025-01-25 DIAGNOSIS — E11.65 TYPE 2 DIABETES MELLITUS WITH HYPERGLYCEMIA, WITH LONG-TERM CURRENT USE OF INSULIN (HCC): ICD-10-CM

## 2025-01-27 ENCOUNTER — TELEPHONE (OUTPATIENT)
Age: 60
End: 2025-01-27

## 2025-01-27 DIAGNOSIS — E11.65 TYPE 2 DIABETES MELLITUS WITH HYPERGLYCEMIA, WITH LONG-TERM CURRENT USE OF INSULIN (HCC): ICD-10-CM

## 2025-01-27 DIAGNOSIS — Z79.4 TYPE 2 DIABETES MELLITUS WITH HYPERGLYCEMIA, WITH LONG-TERM CURRENT USE OF INSULIN (HCC): ICD-10-CM

## 2025-01-27 RX ORDER — DAPAGLIFLOZIN 10 MG/1
10 TABLET, FILM COATED ORAL EVERY MORNING
Qty: 30 TABLET | Refills: 0 | OUTPATIENT
Start: 2025-01-27

## 2025-01-27 RX ORDER — DAPAGLIFLOZIN 10 MG/1
10 TABLET, FILM COATED ORAL EVERY MORNING
Qty: 30 TABLET | Refills: 0 | Status: SHIPPED | OUTPATIENT
Start: 2025-01-27

## 2025-01-27 NOTE — TELEPHONE ENCOUNTER
[facility]     Patient needs farxiga sent to the pharmacy above     Patient has an appointment on 01.30.25

## 2025-02-13 ENCOUNTER — OFFICE VISIT (OUTPATIENT)
Age: 60
End: 2025-02-13
Payer: COMMERCIAL

## 2025-02-13 VITALS
OXYGEN SATURATION: 98 % | BODY MASS INDEX: 33.59 KG/M2 | HEART RATE: 76 BPM | RESPIRATION RATE: 16 BRPM | HEIGHT: 68 IN | WEIGHT: 221.6 LBS | SYSTOLIC BLOOD PRESSURE: 131 MMHG | TEMPERATURE: 99.3 F | DIASTOLIC BLOOD PRESSURE: 68 MMHG

## 2025-02-13 DIAGNOSIS — Z79.4 TYPE 2 DIABETES MELLITUS WITH HYPERGLYCEMIA, WITH LONG-TERM CURRENT USE OF INSULIN (HCC): Primary | ICD-10-CM

## 2025-02-13 DIAGNOSIS — E11.65 TYPE 2 DIABETES MELLITUS WITH HYPERGLYCEMIA, WITH LONG-TERM CURRENT USE OF INSULIN (HCC): Primary | ICD-10-CM

## 2025-02-13 DIAGNOSIS — I10 ESSENTIAL HYPERTENSION: ICD-10-CM

## 2025-02-13 LAB — HBA1C MFR BLD: 9.2 % (ref 4.8–5.6)

## 2025-02-13 PROCEDURE — 3046F HEMOGLOBIN A1C LEVEL >9.0%: CPT | Performed by: STUDENT IN AN ORGANIZED HEALTH CARE EDUCATION/TRAINING PROGRAM

## 2025-02-13 PROCEDURE — 3078F DIAST BP <80 MM HG: CPT | Performed by: STUDENT IN AN ORGANIZED HEALTH CARE EDUCATION/TRAINING PROGRAM

## 2025-02-13 PROCEDURE — 3075F SYST BP GE 130 - 139MM HG: CPT | Performed by: STUDENT IN AN ORGANIZED HEALTH CARE EDUCATION/TRAINING PROGRAM

## 2025-02-13 PROCEDURE — 99214 OFFICE O/P EST MOD 30 MIN: CPT | Performed by: STUDENT IN AN ORGANIZED HEALTH CARE EDUCATION/TRAINING PROGRAM

## 2025-02-13 RX ORDER — DAPAGLIFLOZIN 10 MG/1
10 TABLET, FILM COATED ORAL EVERY MORNING
Qty: 90 TABLET | Refills: 0 | Status: SHIPPED | OUTPATIENT
Start: 2025-02-13

## 2025-02-13 RX ORDER — INSULIN GLARGINE 100 [IU]/ML
50 INJECTION, SOLUTION SUBCUTANEOUS NIGHTLY
Qty: 5 ADJUSTABLE DOSE PRE-FILLED PEN SYRINGE | Refills: 2 | Status: SHIPPED | OUTPATIENT
Start: 2025-02-13

## 2025-02-13 NOTE — PROGRESS NOTES
\"Have you been to the ER, urgent care clinic since your last visit?  Hospitalized since your last visit?\"    YES - When: approximately 1/2025 ago.  Where and Why: [facility] for elbow pain.    “Have you seen or consulted any other health care providers outside our system since your last visit?”    YES - When: approximately 11/2024 ago.  Where and Why: Orthopedics.      “Have you had a colorectal cancer screening such as a colonoscopy/FIT/Cologuard?    YES - Type: Colonoscopy - Where: [facility]      No colonoscopy on file  No cologuard on file  No FIT/FOBT on file   No flexible sigmoidoscopy on file     “Have you had a diabetic eye exam?”    NO     No diabetic eye exam on file     Chief Complaint   Patient presents with    Follow-up    Diabetes     /68 (Site: Right Upper Arm, Position: Sitting, Cuff Size: Large Adult)   Pulse 76   Temp 99.3 °F (37.4 °C) (Temporal)   Resp 16   Ht 1.727 m (5' 8\")   Wt 100.5 kg (221 lb 9.6 oz)   SpO2 98%   BMI 33.69 kg/m²

## 2025-02-13 NOTE — PROGRESS NOTES
[facility]                                                                                    Piper Santiago M.D          Patient Information  Date:2/15/2025  Name : Kerry Monsivais 59 y.o.     YOB: 1965         Referred by: Lenny Delgado MD       Chief Complaint   Patient presents with    Follow-up    Diabetes       History of Present Illness: Kerry Monsivais is a 59 y.o. female Chronic Kidney Disease (Stage-3), Hypertension, Anemia, and Nephropathy , DM type 2 who presented to follow up.     2-2025 presents for follow up after 9 months.          5/30/24 Reports dexcom was 250$.   Taking farixga, bg very slightly better   BG readings : 102- 380  Usually spiking post meal.     Type 2 diabetes mellitus    Glucometer reading:    Results for orders placed or performed in visit on 02/13/25   Hemoglobin A1C   Result Value Ref Range    Hemoglobin A1C 9.2 (H) 4.8 - 5.6 %         · Diagnosis: 2016  · Family history of diabetes Mellitus mother?  · Current treatment: Basaglar, novolog 14 units with meals, Janumet  · Past treatment: glipizide?   · Glucose checks AM : 159-312   Pre Meal 152-228   · Hyperglycemia:   · Hypoglycemia:   · Meals per day: 3  ---Breakfast : fish,   ----Lunch : fish, salad   ----Dinner:  tries to eat before 7 pm prok chop, beans beet   -----Snacks: None   -----Drinks: no soda            · Exercise: Walking   Steroids:on and off for sinusitis  · DM related hospitalizations: no    Smoking: no  Family history of coronary artery disease/stroke:brother had MI- 30s     Complications of DM:  · CAD: no  · CVA: no  · PVD: no  · Amputations: no   · Retinopathy:  >2 years  · Gastropathy: no  · Nephropathy: yes   · Neuropathy: no   Sees podiatrist:    Medications:  · Statin: Pravastatin   · ACE-I: hyzaar   · ASA: aspirin    · Diabetes education: no       Past Medical History:   Diagnosis Date    DM (diabetes mellitus) (H[facility])     High cholesterol     HTN

## 2025-02-24 ENCOUNTER — APPOINTMENT (OUTPATIENT)
Facility: HOSPITAL | Age: 60
End: 2025-02-24
Payer: COMMERCIAL

## 2025-02-24 ENCOUNTER — HOSPITAL ENCOUNTER (EMERGENCY)
Facility: HOSPITAL | Age: 60
Discharge: HOME | End: 2025-02-24
Payer: COMMERCIAL

## 2025-02-24 VITALS
BODY MASS INDEX: 32.89 KG/M2 | DIASTOLIC BLOOD PRESSURE: 68 MMHG | OXYGEN SATURATION: 100 % | WEIGHT: 217 LBS | TEMPERATURE: 98.9 F | HEART RATE: 89 BPM | RESPIRATION RATE: 18 BRPM | SYSTOLIC BLOOD PRESSURE: 118 MMHG | HEIGHT: 68 IN

## 2025-02-24 DIAGNOSIS — N39.0 UTI (URINARY TRACT INFECTION), UNCOMPLICATED: ICD-10-CM

## 2025-02-24 DIAGNOSIS — R10.13 ABDOMINAL PAIN, EPIGASTRIC: Primary | ICD-10-CM

## 2025-02-24 DIAGNOSIS — R79.89 ELEVATED TROPONIN LEVEL NOT DUE MYOCARDIAL INFARCTION: ICD-10-CM

## 2025-02-24 DIAGNOSIS — K59.00 CONSTIPATION, UNSPECIFIED CONSTIPATION TYPE: ICD-10-CM

## 2025-02-24 LAB
ALBUMIN SERPL-MCNC: 3.8 G/DL (ref 3.5–5)
ALBUMIN/GLOB SERPL: 0.9 (ref 1.1–2.2)
ALP SERPL-CCNC: 80 U/L (ref 45–117)
ALT SERPL-CCNC: 30 U/L (ref 12–78)
ANION GAP SERPL CALC-SCNC: 9 MMOL/L (ref 2–12)
APPEARANCE UR: CLEAR
AST SERPL W P-5'-P-CCNC: 22 U/L (ref 15–37)
BACTERIA URNS QL MICRO: ABNORMAL /HPF
BASOPHILS # BLD: 0.01 K/UL (ref 0–0.1)
BASOPHILS NFR BLD: 0.2 % (ref 0–1)
BILIRUB SERPL-MCNC: 0.4 MG/DL (ref 0.2–1)
BILIRUB UR QL: NEGATIVE
BUN SERPL-MCNC: 33 MG/DL (ref 6–20)
BUN/CREAT SERPL: 18 (ref 12–20)
CA-I BLD-MCNC: 9 MG/DL (ref 8.5–10.1)
CHLORIDE SERPL-SCNC: 104 MMOL/L (ref 97–108)
CO2 SERPL-SCNC: 28 MMOL/L (ref 21–32)
COLOR UR: ABNORMAL
CREAT SERPL-MCNC: 1.79 MG/DL (ref 0.55–1.02)
DIFFERENTIAL METHOD BLD: ABNORMAL
EKG ATRIAL RATE: 88 BPM
EKG DIAGNOSIS: NORMAL
EKG P AXIS: 47 DEGREES
EKG P-R INTERVAL: 138 MS
EKG Q-T INTERVAL: 356 MS
EKG QRS DURATION: 90 MS
EKG QTC CALCULATION (BAZETT): 430 MS
EKG R AXIS: 6 DEGREES
EKG T AXIS: 45 DEGREES
EKG VENTRICULAR RATE: 88 BPM
EOSINOPHIL # BLD: 0.03 K/UL (ref 0–0.4)
EOSINOPHIL NFR BLD: 0.5 % (ref 0–7)
EPITH CASTS URNS QL MICRO: ABNORMAL /LPF
ERYTHROCYTE [DISTWIDTH] IN BLOOD BY AUTOMATED COUNT: 14.4 % (ref 11.5–14.5)
GLOBULIN SER CALC-MCNC: 4.1 G/DL (ref 2–4)
GLUCOSE SERPL-MCNC: 101 MG/DL (ref 65–100)
GLUCOSE UR STRIP.AUTO-MCNC: >1000 MG/DL
HCT VFR BLD AUTO: 37.2 % (ref 35–47)
HGB BLD-MCNC: 11.3 G/DL (ref 11.5–16)
HGB UR QL STRIP: NEGATIVE
IMM GRANULOCYTES # BLD AUTO: 0.02 K/UL (ref 0–0.04)
IMM GRANULOCYTES NFR BLD AUTO: 0.3 % (ref 0–0.5)
KETONES UR QL STRIP.AUTO: NEGATIVE MG/DL
LEUKOCYTE ESTERASE UR QL STRIP.AUTO: ABNORMAL
LIPASE SERPL-CCNC: 56 U/L (ref 13–75)
LYMPHOCYTES # BLD: 3.11 K/UL (ref 0.8–3.5)
LYMPHOCYTES NFR BLD: 47.1 % (ref 12–49)
MCH RBC QN AUTO: 22.6 PG (ref 26–34)
MCHC RBC AUTO-ENTMCNC: 30.4 G/DL (ref 30–36.5)
MCV RBC AUTO: 74.5 FL (ref 80–99)
MONOCYTES # BLD: 0.36 K/UL (ref 0–1)
MONOCYTES NFR BLD: 5.5 % (ref 5–13)
NEUTS SEG # BLD: 3.07 K/UL (ref 1.8–8)
NEUTS SEG NFR BLD: 46.4 % (ref 32–75)
NITRITE UR QL STRIP.AUTO: NEGATIVE
PH UR STRIP: 5 (ref 5–8)
PLATELET # BLD AUTO: 253 K/UL (ref 150–400)
PMV BLD AUTO: 11.6 FL (ref 8.9–12.9)
POTASSIUM SERPL-SCNC: 4.2 MMOL/L (ref 3.5–5.1)
PROT SERPL-MCNC: 7.9 G/DL (ref 6.4–8.2)
PROT UR STRIP-MCNC: NEGATIVE MG/DL
RBC # BLD AUTO: 4.99 M/UL (ref 3.8–5.2)
RBC #/AREA URNS HPF: ABNORMAL /HPF (ref 0–5)
SODIUM SERPL-SCNC: 141 MMOL/L (ref 136–145)
SP GR UR REFRACTOMETRY: 1.01 (ref 1–1.03)
TROPONIN I SERPL HS-MCNC: 66 NG/L (ref 0–51)
TROPONIN I SERPL HS-MCNC: 71 NG/L (ref 0–51)
TROPONIN I SERPL HS-MCNC: 83 NG/L (ref 0–51)
URINE CULTURE IF INDICATED: ABNORMAL
UROBILINOGEN UR QL STRIP.AUTO: 0.1 EU/DL (ref 0.2–1)
WBC # BLD AUTO: 6.6 K/UL (ref 3.6–11)
WBC URNS QL MICRO: ABNORMAL /HPF (ref 0–4)

## 2025-02-24 PROCEDURE — 85025 COMPLETE CBC W/AUTO DIFF WBC: CPT

## 2025-02-24 PROCEDURE — 80053 COMPREHEN METABOLIC PANEL: CPT

## 2025-02-24 PROCEDURE — 81001 URINALYSIS AUTO W/SCOPE: CPT

## 2025-02-24 PROCEDURE — 99285 EMERGENCY DEPT VISIT HI MDM: CPT

## 2025-02-24 PROCEDURE — 93005 ELECTROCARDIOGRAM TRACING: CPT | Performed by: NURSE PRACTITIONER

## 2025-02-24 PROCEDURE — 74177 CT ABD & PELVIS W/CONTRAST: CPT

## 2025-02-24 PROCEDURE — 83690 ASSAY OF LIPASE: CPT

## 2025-02-24 PROCEDURE — 36415 COLL VENOUS BLD VENIPUNCTURE: CPT

## 2025-02-24 PROCEDURE — 84484 ASSAY OF TROPONIN QUANT: CPT

## 2025-02-24 PROCEDURE — 6360000004 HC RX CONTRAST MEDICATION: Performed by: NURSE PRACTITIONER

## 2025-02-24 RX ORDER — CEPHALEXIN 500 MG/1
500 CAPSULE ORAL 2 TIMES DAILY
Qty: 14 CAPSULE | Refills: 0 | Status: SHIPPED | OUTPATIENT
Start: 2025-02-24 | End: 2025-03-03

## 2025-02-24 RX ORDER — IOPAMIDOL 755 MG/ML
100 INJECTION, SOLUTION INTRAVASCULAR
Status: COMPLETED | OUTPATIENT
Start: 2025-02-24 | End: 2025-02-24

## 2025-02-24 RX ADMIN — IOPAMIDOL 100 ML: 755 INJECTION, SOLUTION INTRAVENOUS at 12:07

## 2025-02-24 ASSESSMENT — HEART SCORE: ECG: NORMAL

## 2025-02-24 NOTE — ED PROVIDER NOTES
hour(s))   CBC with Auto Differential    Collection Time: 02/24/25 11:20 AM   Result Value Ref Range    WBC 6.6 3.6 - 11.0 K/uL    RBC 4.99 3.80 - 5.20 M/uL    Hemoglobin 11.3 (L) 11.5 - 16.0 g/dL    Hematocrit 37.2 35.0 - 47.0 %    MCV 74.5 (L) 80.0 - 99.0 FL    MCH 22.6 (L) 26.0 - 34.0 PG    MCHC 30.4 30.0 - 36.5 g/dL    RDW 14.4 11.5 - 14.5 %    Platelets 253 150 - 400 K/uL    MPV 11.6 8.9 - 12.9 FL    Neutrophils % 46.4 32.0 - 75.0 %    Lymphocytes % 47.1 12.0 - 49.0 %    Monocytes % 5.5 5.0 - 13.0 %    Eosinophils % 0.5 0.0 - 7.0 %    Basophils % 0.2 0.0 - 1.0 %    Immature Granulocytes % 0.3 0.0 - 0.5 %    Neutrophils Absolute 3.07 1.80 - 8.00 K/UL    Lymphocytes Absolute 3.11 0.80 - 3.50 K/UL    Monocytes Absolute 0.36 0.00 - 1.00 K/UL    Eosinophils Absolute 0.03 0.00 - 0.40 K/UL    Basophils Absolute 0.01 0.00 - 0.10 K/UL    Immature Granulocytes Absolute 0.02 0.00 - 0.04 K/UL    Differential Type AUTOMATED     Comprehensive Metabolic Panel    Collection Time: 02/24/25 11:20 AM   Result Value Ref Range    Sodium 141 136 - 145 mmol/L    Potassium 4.2 3.5 - 5.1 mmol/L    Chloride 104 97 - 108 mmol/L    CO2 28 21 - 32 mmol/L    Anion Gap 9 2 - 12 mmol/L    Glucose 101 (H) 65 - 100 mg/dL    BUN 33 (H) 6 - 20 mg/dL    Creatinine 1.79 (H) 0.55 - 1.02 mg/dL    BUN/Creatinine Ratio 18 12 - 20      Est, Glom Filt Rate 32 (L) >60 ml/min/1.73m2    Calcium 9.0 8.5 - 10.1 mg/dL    Total Bilirubin 0.4 0.2 - 1.0 mg/dL    AST 22 15 - 37 U/L    ALT 30 12 - 78 U/L    Alk Phosphatase 80 45 - 117 U/L    Total Protein 7.9 6.4 - 8.2 g/dL    Albumin 3.8 3.5 - 5.0 g/dL    Globulin 4.1 (H) 2.0 - 4.0 g/dL    Albumin/Globulin Ratio 0.9 (L) 1.1 - 2.2     Lipase    Collection Time: 02/24/25 11:20 AM   Result Value Ref Range    Lipase 56 13 - 75 U/L   Troponin    Collection Time: 02/24/25 11:20 AM   Result Value Ref Range    Troponin, High Sensitivity 83 (H) 0 - 51 ng/L   EKG 12 Lead    Collection Time: 02/24/25 11:43 AM   Result Value

## 2025-02-24 NOTE — ED TRIAGE NOTES
Pt referred to ED by [facility] for possible bowel blockage; seen on Saturday for abdominal discomfort    Last solid BM on 2/18/25, has had loose stools since then